# Patient Record
Sex: FEMALE | Race: WHITE | NOT HISPANIC OR LATINO | Employment: FULL TIME | URBAN - METROPOLITAN AREA
[De-identification: names, ages, dates, MRNs, and addresses within clinical notes are randomized per-mention and may not be internally consistent; named-entity substitution may affect disease eponyms.]

---

## 2021-08-09 ENCOUNTER — TRANSFERRED RECORDS (OUTPATIENT)
Dept: HEALTH INFORMATION MANAGEMENT | Facility: CLINIC | Age: 21
End: 2021-08-09

## 2021-09-01 ENCOUNTER — TRANSFERRED RECORDS (OUTPATIENT)
Dept: HEALTH INFORMATION MANAGEMENT | Facility: CLINIC | Age: 21
End: 2021-09-01

## 2021-09-08 ENCOUNTER — TRANSFERRED RECORDS (OUTPATIENT)
Dept: HEALTH INFORMATION MANAGEMENT | Facility: CLINIC | Age: 21
End: 2021-09-08

## 2021-09-13 ENCOUNTER — TRANSCRIBE ORDERS (OUTPATIENT)
Dept: MATERNAL FETAL MEDICINE | Facility: CLINIC | Age: 21
End: 2021-09-13

## 2021-09-13 DIAGNOSIS — O26.90 PREGNANCY RELATED CONDITION, ANTEPARTUM: Primary | ICD-10-CM

## 2021-09-14 ENCOUNTER — PRE VISIT (OUTPATIENT)
Dept: MATERNAL FETAL MEDICINE | Facility: CLINIC | Age: 21
End: 2021-09-14

## 2021-09-15 ENCOUNTER — OFFICE VISIT (OUTPATIENT)
Dept: MATERNAL FETAL MEDICINE | Facility: CLINIC | Age: 21
End: 2021-09-15
Attending: FAMILY MEDICINE
Payer: COMMERCIAL

## 2021-09-15 ENCOUNTER — HOSPITAL ENCOUNTER (OUTPATIENT)
Dept: ULTRASOUND IMAGING | Facility: CLINIC | Age: 21
Discharge: HOME | End: 2021-09-15
Attending: FAMILY MEDICINE | Admitting: FAMILY MEDICINE
Payer: COMMERCIAL

## 2021-09-15 DIAGNOSIS — O26.90 PREGNANCY RELATED CONDITION, ANTEPARTUM: ICD-10-CM

## 2021-09-15 DIAGNOSIS — O28.8 AMNIOTIC FLUID INDEX BORDERLINE LOW: Primary | ICD-10-CM

## 2021-09-15 LAB — RUPTURE OF FETAL MEMBRANES BY ROM PLUS: NEGATIVE

## 2021-09-15 PROCEDURE — 76811 OB US DETAILED SNGL FETUS: CPT

## 2021-09-15 PROCEDURE — 99242 OFF/OP CONSLTJ NEW/EST SF 20: CPT | Mod: 25 | Performed by: OBSTETRICS & GYNECOLOGY

## 2021-09-15 PROCEDURE — 76811 OB US DETAILED SNGL FETUS: CPT | Mod: 26 | Performed by: OBSTETRICS & GYNECOLOGY

## 2021-09-15 PROCEDURE — 84112 EVAL AMNIOTIC FLUID PROTEIN: CPT | Performed by: OBSTETRICS & GYNECOLOGY

## 2021-09-16 ENCOUNTER — MEDICAL CORRESPONDENCE (OUTPATIENT)
Dept: HEALTH INFORMATION MANAGEMENT | Facility: CLINIC | Age: 21
End: 2021-09-16

## 2021-09-17 NOTE — PROGRESS NOTES
Jessica Aide was seen for an ultrasound today at the Maternal-Fetal Medicine center.      For the details of the ultrasound please see the report which can be found under the imaging tab.      Jeannine Trevizo MD  , OB/GYN  Maternal-Fetal Medicine  [email or web address]  [phone] (Main M Office)  [phone] or [phone] (for 24 hour MFM questions)  [phone] (Pager)

## 2021-10-06 ENCOUNTER — HOSPITAL ENCOUNTER (OUTPATIENT)
Dept: ULTRASOUND IMAGING | Facility: CLINIC | Age: 21
End: 2021-10-06
Attending: OBSTETRICS & GYNECOLOGY
Payer: MEDICAID

## 2021-10-06 ENCOUNTER — OFFICE VISIT (OUTPATIENT)
Dept: MATERNAL FETAL MEDICINE | Facility: CLINIC | Age: 21
End: 2021-10-06
Attending: OBSTETRICS & GYNECOLOGY
Payer: MEDICAID

## 2021-10-06 ENCOUNTER — LAB (OUTPATIENT)
Dept: LAB | Facility: CLINIC | Age: 21
End: 2021-10-06
Attending: OBSTETRICS & GYNECOLOGY
Payer: MEDICAID

## 2021-10-06 DIAGNOSIS — O28.3 ABNORMAL ULTRASONIC FINDING ON ANTENATAL SCREENING OF MOTHER: ICD-10-CM

## 2021-10-06 DIAGNOSIS — O33.7XX0 FETAL ASCITES CAUSING DISPROPORTION: Primary | ICD-10-CM

## 2021-10-06 DIAGNOSIS — Z04.89 ENCOUNTER FOR EXAMINATION AND OBSERVATION FOR OTHER SPECIFIED REASONS: ICD-10-CM

## 2021-10-06 DIAGNOSIS — O28.3 ABNORMAL ULTRASONIC FINDING ON ANTENATAL SCREENING OF MOTHER: Primary | ICD-10-CM

## 2021-10-06 DIAGNOSIS — O33.7XX0 FETAL ASCITES CAUSING DISPROPORTION: ICD-10-CM

## 2021-10-06 LAB
ABO/RH(D): NORMAL
ANTIBODY SCREEN: NEGATIVE
FETAL RBC % LFV: 0.2 %
FETAL RBC (ML): 12 ML
IF INDICATED RECOMMENDED DOSE OF RH IMMUNE GLOBULIN UG: 300 UG
Lab: NORMAL
PERFORMING LABORATORY: NORMAL
SPECIMEN EXPIRATION DATE: NORMAL
SPECIMEN STATUS: NORMAL
TEST NAME: NORMAL

## 2021-10-06 PROCEDURE — 87798 DETECT AGENT NOS DNA AMP: CPT | Performed by: OBSTETRICS & GYNECOLOGY

## 2021-10-06 PROCEDURE — 96372 THER/PROPH/DIAG INJ SC/IM: CPT | Performed by: OBSTETRICS & GYNECOLOGY

## 2021-10-06 PROCEDURE — 86900 BLOOD TYPING SEROLOGIC ABO: CPT

## 2021-10-06 PROCEDURE — 59000 AMNIOCENTESIS DIAGNOSTIC: CPT

## 2021-10-06 PROCEDURE — 76821 MIDDLE CEREBRAL ARTERY ECHO: CPT

## 2021-10-06 PROCEDURE — 76816 OB US FOLLOW-UP PER FETUS: CPT

## 2021-10-06 PROCEDURE — 76816 OB US FOLLOW-UP PER FETUS: CPT | Mod: 26 | Performed by: OBSTETRICS & GYNECOLOGY

## 2021-10-06 PROCEDURE — 84999 UNLISTED CHEMISTRY PROCEDURE: CPT | Performed by: OBSTETRICS & GYNECOLOGY

## 2021-10-06 PROCEDURE — 87496 CYTOMEG DNA AMP PROBE: CPT | Mod: XU | Performed by: OBSTETRICS & GYNECOLOGY

## 2021-10-06 PROCEDURE — 85460 HEMOGLOBIN FETAL: CPT

## 2021-10-06 PROCEDURE — 88275 CYTOGENETICS 100-300: CPT | Performed by: OBSTETRICS & GYNECOLOGY

## 2021-10-06 PROCEDURE — 59000 AMNIOCENTESIS DIAGNOSTIC: CPT | Performed by: OBSTETRICS & GYNECOLOGY

## 2021-10-06 PROCEDURE — 96040 HC GENETIC COUNSELING, EACH 30 MINUTES: CPT | Performed by: GENETIC COUNSELOR, MS

## 2021-10-06 PROCEDURE — 88271 CYTOGENETICS DNA PROBE: CPT | Mod: XU | Performed by: OBSTETRICS & GYNECOLOGY

## 2021-10-06 PROCEDURE — 81265 STR MARKERS SPECIMEN ANAL: CPT | Performed by: OBSTETRICS & GYNECOLOGY

## 2021-10-06 PROCEDURE — 76946 ECHO GUIDE FOR AMNIOCENTESIS: CPT | Mod: 26 | Performed by: OBSTETRICS & GYNECOLOGY

## 2021-10-06 PROCEDURE — 36415 COLL VENOUS BLD VENIPUNCTURE: CPT

## 2021-10-06 PROCEDURE — 99215 OFFICE O/P EST HI 40 MIN: CPT | Mod: 25 | Performed by: OBSTETRICS & GYNECOLOGY

## 2021-10-06 PROCEDURE — 76821 MIDDLE CEREBRAL ARTERY ECHO: CPT | Mod: 26 | Performed by: OBSTETRICS & GYNECOLOGY

## 2021-10-06 PROCEDURE — 250N000011 HC RX IP 250 OP 636: Performed by: OBSTETRICS & GYNECOLOGY

## 2021-10-06 PROCEDURE — 81265 STR MARKERS SPECIMEN ANAL: CPT | Mod: XU

## 2021-10-06 RX ORDER — BETAMETHASONE SODIUM PHOSPHATE AND BETAMETHASONE ACETATE 3; 3 MG/ML; MG/ML
12 INJECTION, SUSPENSION INTRA-ARTICULAR; INTRALESIONAL; INTRAMUSCULAR; SOFT TISSUE ONCE
Status: COMPLETED | OUTPATIENT
Start: 2021-10-06 | End: 2021-10-06

## 2021-10-06 RX ADMIN — BETAMETHASONE SODIUM PHOSPHATE AND BETAMETHASONE ACETATE 12 MG: 3; 3 INJECTION, SUSPENSION INTRA-ARTICULAR; INTRALESIONAL; INTRAMUSCULAR at 11:13

## 2021-10-06 NOTE — PROGRESS NOTES
"Please see \"Imaging\" tab under \"Chart Review\" for details of today's visit.    Penny Davila    "

## 2021-10-06 NOTE — NURSING NOTE
Pt here for amniocentesis d/t fetal ascites. Saw [facility], see their dictation.  After consent signed and TimeOut completed, Dr. Davila withdrew adequate fluid x1 transabdominal pass.    Patient reports 3/10 pain, reports improvement since procedure has ended.  Pt is RH positive.  MD reviewed blood type and screen and Rhogam NOT indicated.  Discharge teaching completed and questions answered.  Pt discharged ambulatory and stable.   Lab alerted by calling phone number on amnio work-aid for [facility] site.  [employer] notified of specimen.  Specimen transported to main lab, warm hand-off completed.

## 2021-10-06 NOTE — LETTER
10/6/2021  AideJessica   2000        To Whom It May Concern:    Please excuse Jessica from work today, 10/6/21, for medical purposes related to her pregnancy. Jessica will need ongoing frequent surveillance due to pregnancy complications which will also require her to be excused from work.    Sincerely,      Dr. Davila

## 2021-10-06 NOTE — PATIENT INSTRUCTIONS
As part of your visit in Maternal Fetal Medicine, you elected to have a genetic amniocentesis, an invasive diagnostic procedure. The following information was discussed.:    Amniocentesis is an invasive test that can diagnose these types of structural chromosome abnormalities with greater than 99% accuracy.     The risk of pregnancy loss association with amniocentesis is generally estimated to be 1/500  or less.    Mild cramping is very common. It usually goes away within a few hours. You may take Acetaminophen (Tylenol ) for this if needed    Avoid strenuous activities for the rest of the day after the Amniocentesis is done. This includes heavy lifting, jogging or other exercise.    You should call your regular obstetric (OB) care provider if you have:  o Heavy bleeding  o Clear fluid (like water) leaking from your vagina  o Severe abdominal (belly) pain  o Flu-like symptoms within two weeks of the test. These include chills, muscle aches or a fever over 100.4 F (38 C) (under the tongue).  The following testing was ordered on the amniotic fluid sample:    FISH preliminary analysis - results by Friday or Monday.    Microarray analysis - results typically available in 7-10 days. Testing may take up to 14 days.  o Maternal Cell Contamination studies are performed on amnio specimens with microarray    PCR studies for cytomegalovirus, parvovirus, and toxoplasmosis. Expected within 3-10 days.     Culture for possible hydrops panel if indicated.    Results are not guaranteed  Results will be communicated to you, forwarded to your primary OB provider, and available in [employer].

## 2021-10-07 ENCOUNTER — HOSPITAL ENCOUNTER (OUTPATIENT)
Facility: CLINIC | Age: 21
Setting detail: OBSERVATION
Discharge: HOME | End: 2021-10-08
Attending: OBSTETRICS & GYNECOLOGY | Admitting: OBSTETRICS & GYNECOLOGY
Payer: MEDICAID

## 2021-10-07 ENCOUNTER — OFFICE VISIT (OUTPATIENT)
Dept: MATERNAL FETAL MEDICINE | Facility: CLINIC | Age: 21
End: 2021-10-07
Attending: OBSTETRICS & GYNECOLOGY
Payer: MEDICAID

## 2021-10-07 ENCOUNTER — HOSPITAL ENCOUNTER (OUTPATIENT)
Dept: ULTRASOUND IMAGING | Facility: CLINIC | Age: 21
End: 2021-10-07
Attending: OBSTETRICS & GYNECOLOGY
Payer: MEDICAID

## 2021-10-07 DIAGNOSIS — O43.012: ICD-10-CM

## 2021-10-07 DIAGNOSIS — O33.7XX0 FETAL ASCITES CAUSING DISPROPORTION: Primary | ICD-10-CM

## 2021-10-07 DIAGNOSIS — O33.7XX0 FETAL ASCITES CAUSING DISPROPORTION: ICD-10-CM

## 2021-10-07 PROBLEM — O35.9XX0 FETAL ABNORMALITY AFFECTING MANAGEMENT OF MOTHER, ANTEPARTUM, SINGLE OR UNSPECIFIED FETUS: Status: ACTIVE | Noted: 2021-10-07

## 2021-10-07 PROBLEM — O35.9XX0 FETAL ABNORMALITY AFFECTING MANAGEMENT OF MOTHER: Status: ACTIVE | Noted: 2021-10-07

## 2021-10-07 LAB
CLUE CELLS: ABNORMAL
Lab: NORMAL
PERFORMING LABORATORY: NORMAL
RUPTURE OF FETAL MEMBRANES BY ROM PLUS: NEGATIVE
SARS-COV-2 RNA RESP QL NAA+PROBE: NEGATIVE
SPECIMEN STATUS: NORMAL
TEST NAME: NORMAL
TRICHOMONAS, WET PREP: ABNORMAL
WBC'S/HIGH POWER FIELD, WET PREP: ABNORMAL
YEAST, WET PREP: ABNORMAL

## 2021-10-07 PROCEDURE — 84112 EVAL AMNIOTIC FLUID PROTEIN: CPT | Performed by: STUDENT IN AN ORGANIZED HEALTH CARE EDUCATION/TRAINING PROGRAM

## 2021-10-07 PROCEDURE — C9803 HOPD COVID-19 SPEC COLLECT: HCPCS

## 2021-10-07 PROCEDURE — 76821 MIDDLE CEREBRAL ARTERY ECHO: CPT

## 2021-10-07 PROCEDURE — 76821 MIDDLE CEREBRAL ARTERY ECHO: CPT | Mod: 26 | Performed by: OBSTETRICS & GYNECOLOGY

## 2021-10-07 PROCEDURE — 96372 THER/PROPH/DIAG INJ SC/IM: CPT | Performed by: OBSTETRICS & GYNECOLOGY

## 2021-10-07 PROCEDURE — G0463 HOSPITAL OUTPT CLINIC VISIT: HCPCS | Mod: 25

## 2021-10-07 PROCEDURE — 250N000011 HC RX IP 250 OP 636: Performed by: OBSTETRICS & GYNECOLOGY

## 2021-10-07 PROCEDURE — 120N000002 HC R&B MED SURG/OB UMMC

## 2021-10-07 PROCEDURE — G0378 HOSPITAL OBSERVATION PER HR: HCPCS

## 2021-10-07 PROCEDURE — 87210 SMEAR WET MOUNT SALINE/INK: CPT | Performed by: STUDENT IN AN ORGANIZED HEALTH CARE EDUCATION/TRAINING PROGRAM

## 2021-10-07 PROCEDURE — 76816 OB US FOLLOW-UP PER FETUS: CPT

## 2021-10-07 PROCEDURE — U0005 INFEC AGEN DETEC AMPLI PROBE: HCPCS | Performed by: STUDENT IN AN ORGANIZED HEALTH CARE EDUCATION/TRAINING PROGRAM

## 2021-10-07 PROCEDURE — 87491 CHLMYD TRACH DNA AMP PROBE: CPT | Performed by: STUDENT IN AN ORGANIZED HEALTH CARE EDUCATION/TRAINING PROGRAM

## 2021-10-07 PROCEDURE — 99221 1ST HOSP IP/OBS SF/LOW 40: CPT | Mod: AI | Performed by: OBSTETRICS & GYNECOLOGY

## 2021-10-07 PROCEDURE — 76816 OB US FOLLOW-UP PER FETUS: CPT | Mod: 26 | Performed by: OBSTETRICS & GYNECOLOGY

## 2021-10-07 RX ORDER — PRENATAL VIT/IRON FUM/FOLIC AC 27MG-0.8MG
1 TABLET ORAL DAILY
COMMUNITY

## 2021-10-07 RX ORDER — DIPHENHYDRAMINE HCL 25 MG
25 CAPSULE ORAL EVERY 6 HOURS PRN
Status: DISCONTINUED | OUTPATIENT
Start: 2021-10-07 | End: 2021-10-08 | Stop reason: HOSPADM

## 2021-10-07 RX ORDER — PRENATAL VIT/IRON FUM/FOLIC AC 27MG-0.8MG
1 TABLET ORAL DAILY
Status: DISCONTINUED | OUTPATIENT
Start: 2021-10-08 | End: 2021-10-08 | Stop reason: HOSPADM

## 2021-10-07 RX ORDER — POLYETHYLENE GLYCOL 3350 17 G/17G
25.5 POWDER, FOR SOLUTION ORAL DAILY
Status: DISCONTINUED | OUTPATIENT
Start: 2021-10-08 | End: 2021-10-08 | Stop reason: HOSPADM

## 2021-10-07 RX ORDER — DIPHENHYDRAMINE HYDROCHLORIDE 50 MG/ML
25 INJECTION INTRAMUSCULAR; INTRAVENOUS EVERY 6 HOURS PRN
Status: DISCONTINUED | OUTPATIENT
Start: 2021-10-07 | End: 2021-10-08 | Stop reason: HOSPADM

## 2021-10-07 RX ORDER — MAGNESIUM HYDROXIDE/ALUMINUM HYDROXICE/SIMETHICONE 120; 1200; 1200 MG/30ML; MG/30ML; MG/30ML
30 SUSPENSION ORAL
Status: DISCONTINUED | OUTPATIENT
Start: 2021-10-07 | End: 2021-10-08 | Stop reason: HOSPADM

## 2021-10-07 RX ORDER — SIMETHICONE 80 MG
160 TABLET,CHEWABLE ORAL EVERY 4 HOURS PRN
Status: DISCONTINUED | OUTPATIENT
Start: 2021-10-07 | End: 2021-10-08 | Stop reason: HOSPADM

## 2021-10-07 RX ORDER — BETAMETHASONE SODIUM PHOSPHATE AND BETAMETHASONE ACETATE 3; 3 MG/ML; MG/ML
12 INJECTION, SUSPENSION INTRA-ARTICULAR; INTRALESIONAL; INTRAMUSCULAR; SOFT TISSUE ONCE
Status: DISCONTINUED | OUTPATIENT
Start: 2021-10-07 | End: 2021-10-07

## 2021-10-07 RX ORDER — ACETAMINOPHEN 325 MG/1
650 TABLET ORAL EVERY 4 HOURS PRN
Status: DISCONTINUED | OUTPATIENT
Start: 2021-10-07 | End: 2021-10-08 | Stop reason: HOSPADM

## 2021-10-07 RX ORDER — POLYETHYLENE GLYCOL 3350 17 G/17G
1.5 POWDER, FOR SOLUTION ORAL DAILY
COMMUNITY

## 2021-10-07 RX ADMIN — BETAMETHASONE SODIUM PHOSPHATE AND BETAMETHASONE ACETATE 12 MG: 3; 3 INJECTION, SUSPENSION INTRA-ARTICULAR; INTRALESIONAL; INTRAMUSCULAR at 10:36

## 2021-10-07 ASSESSMENT — MIFFLIN-ST. JEOR: SCORE: 1321.53

## 2021-10-07 ASSESSMENT — ACTIVITIES OF DAILY LIVING (ADL)
HEARING_DIFFICULTY_OR_DEAF: NO
VISION_MANAGEMENT: GLASSES AND CONTACTS

## 2021-10-07 NOTE — NURSING NOTE
MEDICATION: Celestone Soluspan (Betamethasone)  ROUTE:  Intramuscular  TIME:   10:36  SITE:    Right ventrogluteal  DOSE:  12mg/2ml   (6mg/ml)  LOT #: [id number]  EXPIRATION DATE:    Waste: 3ml  Education Sheet reviewed with patient:  Done    Pt sent to [facility] for extended monitoring due to +KB.  Nicki rhodes RN notified and given SBAR.  On call MD aware and spoke with Dr Trevizo.  Pt given map and directions and denies questions/concerns.

## 2021-10-07 NOTE — LETTER
[facility]   [address] [place] [address]  Phone: [phone]    October 8, 2021        Jessica Noble  [address]   [facility] [address]    To whom it may concern:    RE: Jessica Noble (2000) was admitted to [facility] on 10/7/2021  for medical complications. She is expected to discharge on 10/8/2021 if there are no further complications.    We write to request your support of Jessica who has been away from work due to being admitted to the hospital.      Please feel free to access our unit , KAYA Zuñiga, if you require additional information to support this request.         Sincerely,     MD Ailyn Arthur, ROXI, IDALIA  Maternal and Child Health   Office: [phone]  Pager: [phone]  After Hours Pager: [phone]  [email or web address]
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
## 2021-10-07 NOTE — PROGRESS NOTES
Jessica Aide was seen for an ultrasound today at the Maternal-Fetal Medicine center.      For the details of the ultrasound please see the report which can be found under the imaging tab.      Jeannine Trevizo MD  , OB/GYN  Maternal-Fetal Medicine  [email or web address]  [phone] (Main M Office)  [phone] or [phone] (for 24 hour MFM questions)  [phone] (Pager)
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
## 2021-10-08 ENCOUNTER — APPOINTMENT (OUTPATIENT)
Dept: ULTRASOUND IMAGING | Facility: CLINIC | Age: 21
End: 2021-10-08
Attending: OBSTETRICS & GYNECOLOGY
Payer: MEDICAID

## 2021-10-08 VITALS
TEMPERATURE: 98.4 F | BODY MASS INDEX: 19.13 KG/M2 | DIASTOLIC BLOOD PRESSURE: 61 MMHG | RESPIRATION RATE: 16 BRPM | HEIGHT: 66 IN | SYSTOLIC BLOOD PRESSURE: 108 MMHG | HEART RATE: 112 BPM | WEIGHT: 119 LBS

## 2021-10-08 PROBLEM — O35.9XX0 FETAL ABNORMALITY AFFECTING MANAGEMENT OF MOTHER, ANTEPARTUM: Status: ACTIVE | Noted: 2021-10-08

## 2021-10-08 LAB
C TRACH DNA SPEC QL PROBE+SIG AMP: NEGATIVE
N GONORRHOEA DNA SPEC QL NAA+PROBE: NEGATIVE

## 2021-10-08 PROCEDURE — G0378 HOSPITAL OBSERVATION PER HR: HCPCS

## 2021-10-08 PROCEDURE — G0463 HOSPITAL OUTPT CLINIC VISIT: HCPCS | Mod: 25

## 2021-10-08 PROCEDURE — 76816 OB US FOLLOW-UP PER FETUS: CPT | Mod: 26 | Performed by: OBSTETRICS & GYNECOLOGY

## 2021-10-08 PROCEDURE — 76821 MIDDLE CEREBRAL ARTERY ECHO: CPT

## 2021-10-08 PROCEDURE — 76816 OB US FOLLOW-UP PER FETUS: CPT

## 2021-10-08 PROCEDURE — 99231 SBSQ HOSP IP/OBS SF/LOW 25: CPT | Mod: 25 | Performed by: OBSTETRICS & GYNECOLOGY

## 2021-10-08 PROCEDURE — 250N000013 HC RX MED GY IP 250 OP 250 PS 637: Performed by: OBSTETRICS & GYNECOLOGY

## 2021-10-08 PROCEDURE — 76821 MIDDLE CEREBRAL ARTERY ECHO: CPT | Mod: 26 | Performed by: OBSTETRICS & GYNECOLOGY

## 2021-10-08 RX ADMIN — POLYETHYLENE GLYCOL 3350 26 G: 17 POWDER, FOR SOLUTION ORAL at 08:15

## 2021-10-08 RX ADMIN — PRENATAL VITAMINS-IRON FUMARATE 27 MG IRON-FOLIC ACID 0.8 MG TABLET 1 TABLET: at 08:13
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
## 2021-10-08 NOTE — UTILIZATION REVIEW
"[facility]    Admission Status; Secondary Review Determination     Admission Date: 10/7/2021 12:01 PM      Under the authority of the Utilization Management Committee, the utilization review process indicated a secondary review on the above patient.  The review outcome is based on review of the medical records, discussions with staff, and applying clinical experience noted on the date of the review.          (x) Observation Status Appropriate - This patient does not meet hospital inpatient criteria and is placed in observation status. If this patient's primary payer is Medicare and was admitted as an inpatient, Condition Code 44 should be used and patient status changed to \"observation\".     RATIONALE FOR DETERMINATION   20-year-old female who is about 23 weeks pregnant was admitted yesterday with new onset fetal ascites in the context of normal MCA Dopplers and no clear etiology.  Amniocentesis was performed for fetal karyotype and parvovirus infection status.  No other concerning findings for fetal hydrops.  She repeated ultrasound today with stable findings.  She will likely discharge later today.  At the time of this review the patient does not meet medical necessity for inpatient hospitalization and observation status is recommended.    The severity of illness, intensity of service provided, expected LOS and risk for adverse outcome make the care appropriate for further observation; however, doesn't meet criteria for hospital inpatient admission.  Dr Molina was paged/notified of this determination.        The information on this document is developed by the utilization review team in order for the business office to ensure compliance.  This only denotes the appropriateness of proper admission status and does not reflect the quality of care rendered.         The definitions of Inpatient Status and Observation Status used in making the determination above are those provided in the CMS Coverage Manual, Chapter 1 and " Chapter 6, section 70.4.      Sincerely,     Braulio Sanchez DO MPH   Physician Advisor  Utilization Review  [facility]

## 2021-10-08 NOTE — PROGRESS NOTES
Please see full imaging report from ViewPoint program under imaging tab.    Inpatient US stable from 10/7 imaging.     Aris Molina MD  Maternal Fetal Medicine

## 2021-10-08 NOTE — DISCHARGE INSTRUCTIONS
Discharge Instruction for Undelivered Patients      You were seen for: Fetal Assessment  We Consulted: Dr Molina  You had (Test or Medicine): labs, fetal monitoring, US, SW consult.      Call your provider if you notice:  Swelling in your face or increased swelling in your hands or legs.  Headaches that are not relieved by Tylenol (acetaminophen).  Changes in your vision (blurring: seeing spots or stars.)  Nausea (sick to your stomach) and vomiting (throwing up).   Weight gain of 5 pounds or more per week.  Heartburn that doesn't go away.  Signs of bladder infection: pain when you urinate (use the toilet), need to go more often and more urgently.  The bag of harding (rupture of membranes) breaks, or you notice leaking in your underwear.  Bright red blood in your underwear.  Abdominal (lower belly) or stomach pain.  For first baby: Contractions (tightening) less than 5 minutes apart for one hour or more.  Second (plus) baby: Contractions (tightening) less than 10 minutes apart and getting stronger.  *If less than 34 weeks: Contractions (tightening) more than 6 times in one hour.  Increase or change in vaginal discharge (note the color and amount)  Follow-up:  As scheduled in the clinic

## 2021-10-08 NOTE — PLAN OF CARE
VSS, afebrile. EFM see flowsheet. Pt denies bleeding or LOF. Reports continued pale yellow discharge. Denies cramping or ctx. Offers no complaints overnight. Continue with plan of care.

## 2021-10-08 NOTE — PLAN OF CARE
Pt states is comfortable, denies pain, leaking or bleeding. Discharge home ambulating at 1215. She is driving alone home. SW consult done. Discharge instructions given, states no questions.

## 2021-10-08 NOTE — PROVIDER NOTIFICATION
10/07/21 1945   Provider Notification   Provider Name/Title Alejandra   Method of Notification Electronic Page   Notification Reason Status Update   Pt reports going to the bathroom and then noticed a small amount of fluid on her underwear. States it was the color yellow. Informed Dr. Roberts. Pt denies vaginal bleeding, cramping and endorses +FM. Pt was given pad to wear and encouraged to inform nursing of further leaking.     Addendum: Wet prep, GC/Chlam and ROM+ collected. Results pending.

## 2021-10-08 NOTE — PLAN OF CARE
Pt has no complaints. VSS. Afebrile. Denies vaginal bleeding, cramping or contractions. Endorses + FM. Denies vision changes, RUQ pain, epigastric pain, shortness of breath, HA and increased swelling. FHT's AGA. [place] quiet. ROM+ negative. Wet prep 1+ WBCs but otherwise neg. GC/Chlam pending. Patient aware to call nursing for any questions or concerns. Pt stable at this time. Continue with current POC.

## 2021-10-08 NOTE — DISCHARGE SUMMARY
[facility] Discharge Summary    Jessica Noble MRN# [id number]   Age: 20 year old YOB: 2000     Date of Admission:  10/7/2021  Date of Discharge:  10/8/2021  Admitting Physician:  Aris Molina MD  Discharge Physician:  Aris Molina MD    Admit Dx:   - Intrauterine pregnancy at 23w4d   - Fetal ascites    Discharge Dx:  - Same as above    Procedures:  - [facility] US    Admit HPI:  Jessica Noble is a 20 year old  at 23w3d by LMP consistent with 9w2d ultrasound.       She initially presented to [facility] due to low amniotic fluid. Not all fetal anatomy was well visualized at that visit on 9/15/21, so she was scheduled for follow up. On 10/6, she was noted to have new onset fetal ascites in the context of normal MCA dopplers and no clear etiology. An amniocentesis was performed for fetal karyotype and parvovirus infection status.      Today she returned with persistent ascites, normal MCA Doppler (indicating low risk of moderate to severe fetal anemia) and a lab finding post-amnio of a positive KHB screen (suggesting fetal maternal hemorrhage).      She had contractions for a couple hours following amniocentesis yesterday. Currently, no contractions, bleeding, or changes in fetal movement. She has had stable watery discharge.  She has not had spotting since early in pregnancy.     No headache, chest pain, shortness of breath, nausea, vomiting, diarrhea, or leg pain. She does have constipation for which she takes Miralax. Last bowel movement was today.     Please see her Admission H&P and Delivery Summary for further details.    Hospital Course:  She was admitted for fetal monitoring, repeat ultrasound, and repeat MCA dopplers. Fetal monitoring was appropriate for gestational age. Ultrasound showed stable abdominopelvic ascites. MCA dopplers were normal. Her KB was elevated on admission, but this was attributed to being done following amniocentesis. Per lab  medicine, there was no indication for repeat KB test because it is not helpful for assessing changes in degree of fetal maternal hemorrhage. Given MCA dopplers, the suspicion for fetal maternal hemorrhage is low. Social work was consulted for her complicated social situation. She denied suicidal ideation and declined sertraline. On HD#2 maternal and fetal status was stable and she was discharged home.     Discharge Medications:  Discharge Medication List as of 10/8/2021 12:08 PM      CONTINUE these medications which have NOT CHANGED    Details   polyethylene glycol (MIRALAX) 17 GM/Dose powder Take 1.5 capfuls by mouth daily 2 capfuls daily, Historical      Prenatal Vit-Fe Fumarate-FA (PRENATAL MULTIVITAMIN W/IRON) 27-0.8 MG tablet Take 1 tablet by mouth daily, Historical           Discharge/Disposition:  Jessica Noble was discharged to home in stable condition with the following instructions/medications:  1) Call for temperature > 100.4, bright red vaginal bleeding >1 pad an hour x 2 hours, foul smelling vaginal discharge, pain not controlled by usual oral pain meds, persistent nausea and vomiting not controlled on medications  2) Follow-up ([facility])  - 10/12/2021 [facility]  - 10/15/2021 [facility]    Ruiz Rangel MS4    Appreciate note above. I evaluated the patient with the medical student and [facility] staff. I have verified the accuracy of the above documentation and made necessary edits.    Nayan Kirkland MD  Ob/Gyn Resident, PGY-3  10/08/21 2:37 PM    I was present during all key and critical portions of this service. I have made edits to this evaluation note as this is an accurate and comprehensive report on our assessment and plan for this patient.     Aris Molina MD

## 2021-10-09 DIAGNOSIS — Z71.89 OTHER SPECIFIED COUNSELING: ICD-10-CM

## 2021-10-09 LAB
B19V DNA SER QL NAA+PROBE: NOT DETECTED
MISCELLANEOUS TEST 1 (ARUP): NORMAL

## 2021-10-11 ENCOUNTER — PATIENT OUTREACH (OUTPATIENT)
Dept: CARE COORDINATION | Facility: CLINIC | Age: 21
End: 2021-10-11

## 2021-10-11 ENCOUNTER — TELEPHONE (OUTPATIENT)
Dept: MATERNAL FETAL MEDICINE | Facility: CLINIC | Age: 21
End: 2021-10-11

## 2021-10-11 LAB — MISCELLANEOUS TEST 1 (ARUP): NORMAL

## 2021-10-11 NOTE — TELEPHONE ENCOUNTER
October 11, 2021    I left Jessica a message regarding some of the results from her amniocentesis.     FISH results: Results are consistent with a normal male. There were two signals for chromosomes 21, 18, 13, and the sex chromosomes (XY). These results, although reassuring, are not the final results. The final microarray result should be completed in approximately 10 days.     Viral studies: Cytomegalovirus and parvovirus by qualitative PCR were NOT detected in the amniotic fluid.     Results for the chromosomal microarray and for toxoplasmosis are still pending.     I encouraged Jessica to call me if she has questions. We can discuss options for further testing when the remainder of the results return.     Citlalli Wills MS, [facility]  Licensed Genetic Counselor  [facility]  Maternal Fetal Medicine  [email or web address]  [phone]

## 2021-10-11 NOTE — TELEPHONE ENCOUNTER
October 11, 2021    I left Jessica a message to disclose the results of the toxoplasmosis study performed on amniotic fluid.     Toxoplasmosis was NOT identified in the prenatal specimen.    Still pending is the chromosomal microarray. I will call Jessica as soon as that returns. I encouraged her to call me in the interim if there are questions.     Citlalli Wills MS, [facility]  Licensed Genetic Counselor  [facility]  Maternal Fetal Medicine  [email or web address]  [phone]

## 2021-10-11 NOTE — PROGRESS NOTES
Clinic Care Coordination Contact  [facility]/Voicemail       Clinical Data: Care Coordinator Outreach    Outreach attempted x 1.  Left message on patient's voicemail with call back information and requested return call.    Plan:  Care Coordinator will try to reach patient again in 1-2 business days.    Lori Mccullough, [facility]  [phone]  [facility]

## 2021-10-12 ENCOUNTER — HOSPITAL ENCOUNTER (OUTPATIENT)
Dept: ULTRASOUND IMAGING | Facility: CLINIC | Age: 21
End: 2021-10-12
Attending: OBSTETRICS & GYNECOLOGY
Payer: MEDICAID

## 2021-10-12 ENCOUNTER — OFFICE VISIT (OUTPATIENT)
Dept: MATERNAL FETAL MEDICINE | Facility: CLINIC | Age: 21
End: 2021-10-12
Attending: OBSTETRICS & GYNECOLOGY
Payer: MEDICAID

## 2021-10-12 DIAGNOSIS — O33.7XX0 FETAL ASCITES CAUSING DISPROPORTION: Primary | ICD-10-CM

## 2021-10-12 DIAGNOSIS — O33.7XX0 FETAL ASCITES CAUSING DISPROPORTION: ICD-10-CM

## 2021-10-12 PROCEDURE — 76821 MIDDLE CEREBRAL ARTERY ECHO: CPT

## 2021-10-12 PROCEDURE — 76816 OB US FOLLOW-UP PER FETUS: CPT | Mod: 26 | Performed by: OBSTETRICS & GYNECOLOGY

## 2021-10-12 PROCEDURE — 76821 MIDDLE CEREBRAL ARTERY ECHO: CPT | Mod: 26 | Performed by: OBSTETRICS & GYNECOLOGY

## 2021-10-12 PROCEDURE — 76816 OB US FOLLOW-UP PER FETUS: CPT

## 2021-10-12 NOTE — PROGRESS NOTES
Clinic Care Coordination Contact    Background: Care Coordination referral placed from [facility] discharge report for reason of patient meeting criteria for a TCM outreach call by [facility] team.    Assessment: Upon chart review, [facility] Team member will cancel/close the referral for TCM outreach due to reason below:     Patient has a follow up appointment with an appropriate provider today for hospital discharge.     Plan: Care Coordination referral for TCM outreach canceled.    VIVIANE Escudero  [facility] [place], [facility]

## 2021-10-12 NOTE — PROGRESS NOTES
Please see the imaging tab for details of the ultrasound performed today.    Jacqueline Mathews MD  Specialist in Maternal-Fetal Medicine

## 2021-10-13 LAB — MCCMA SPECIMEN: NORMAL

## 2021-10-14 ENCOUNTER — TELEPHONE (OUTPATIENT)
Dept: MATERNAL FETAL MEDICINE | Facility: CLINIC | Age: 21
End: 2021-10-14

## 2021-10-14 NOTE — TELEPHONE ENCOUNTER
Jessica calling to [facility] with c/o sharp back pain and right pelvic pain since last night, HA and bloody noses for 2 days. Pt denies vaginal bleeding or spotting. Denies concern with vaginal discharge or LOF. Pt reports + FM. Advised patient to call primary care OB and be seen at a hospital for evaluation close to home. Pt lives in [place] and receives care in [facility]. Pt has been following with [facility] due to fetal hydrops.       Jeannine Gonzalez RN

## 2021-10-15 ENCOUNTER — OFFICE VISIT (OUTPATIENT)
Dept: CARDIOLOGY | Facility: CLINIC | Age: 21
End: 2021-10-15
Payer: MEDICAID

## 2021-10-15 ENCOUNTER — OFFICE VISIT (OUTPATIENT)
Dept: MATERNAL FETAL MEDICINE | Facility: CLINIC | Age: 21
End: 2021-10-15
Attending: OBSTETRICS & GYNECOLOGY
Payer: MEDICAID

## 2021-10-15 ENCOUNTER — HOSPITAL ENCOUNTER (OUTPATIENT)
Dept: ULTRASOUND IMAGING | Facility: CLINIC | Age: 21
End: 2021-10-15
Attending: OBSTETRICS & GYNECOLOGY
Payer: MEDICAID

## 2021-10-15 ENCOUNTER — HOSPITAL ENCOUNTER (OUTPATIENT)
Dept: CARDIOLOGY | Facility: CLINIC | Age: 21
End: 2021-10-15
Attending: OBSTETRICS & GYNECOLOGY
Payer: MEDICAID

## 2021-10-15 DIAGNOSIS — O28.3 ABNORMAL ULTRASONIC FINDING ON ANTENATAL SCREENING OF MOTHER: Primary | ICD-10-CM

## 2021-10-15 DIAGNOSIS — O33.7XX0 FETAL ASCITES CAUSING DISPROPORTION: ICD-10-CM

## 2021-10-15 DIAGNOSIS — O35.BXX0 FETAL CARDIAC DISEASE AFFECTING PREGNANCY, SINGLE OR UNSPECIFIED FETUS: Primary | ICD-10-CM

## 2021-10-15 PROCEDURE — 99203 OFFICE O/P NEW LOW 30 MIN: CPT | Mod: 25 | Performed by: PEDIATRICS

## 2021-10-15 PROCEDURE — 76816 OB US FOLLOW-UP PER FETUS: CPT

## 2021-10-15 PROCEDURE — 76817 TRANSVAGINAL US OBSTETRIC: CPT | Mod: 26 | Performed by: OBSTETRICS & GYNECOLOGY

## 2021-10-15 PROCEDURE — 76821 MIDDLE CEREBRAL ARTERY ECHO: CPT

## 2021-10-15 PROCEDURE — 76821 MIDDLE CEREBRAL ARTERY ECHO: CPT | Mod: 26 | Performed by: OBSTETRICS & GYNECOLOGY

## 2021-10-15 PROCEDURE — 76816 OB US FOLLOW-UP PER FETUS: CPT | Mod: 26 | Performed by: OBSTETRICS & GYNECOLOGY

## 2021-10-15 PROCEDURE — 76825 ECHO EXAM OF FETAL HEART: CPT | Mod: 26 | Performed by: PEDIATRICS

## 2021-10-15 PROCEDURE — 76827 ECHO EXAM OF FETAL HEART: CPT | Mod: 26 | Performed by: PEDIATRICS

## 2021-10-15 PROCEDURE — 93325 DOPPLER ECHO COLOR FLOW MAPG: CPT | Mod: 26 | Performed by: PEDIATRICS

## 2021-10-15 PROCEDURE — 93325 DOPPLER ECHO COLOR FLOW MAPG: CPT | Mod: XS

## 2021-10-15 NOTE — PROGRESS NOTES
[facility]   Heart Center Fetal Consult Note    Patient:  Jessica Noble MRN:  [id number]   YOB: 2000 Age:  20 year old   Date of Visit:  10/15/2021 PCP:  Teddy Savage MD     Dear Doctor,     I had the pleasure of seeing Jessica Noble at the [facility] on 10/15/2021 in fetal cardiology consultation for fetal echocardiogram results. She presented today accompanied by herself. As you know, she is a 20 year old  at 24w4d who presented for fetal echocardiogram today because of fetal ascites.    I performed and interpreted the fetal echocardiogram today, which demonstrated normal fetal cardiac anatomy. Normal fetal intracardiac connections. Normal right and left ventricular size and function. No hydrops.     I reviewed the echo findings today with Jessica Noble. She is aware that the study was within normal limits with no major cardiac abnormalities. She is aware of the general limitations of fetal echocardiography. No additional fetal echocardiograms are recommended. Post- echocardiogram should be performed within 24 hours of life. A post- echocardiogram is recommended to to assess aortic arch.     Thank you for allowing me to participate in Jessica's care. Please do not hesitate to contact me with questions or concerns.    This visit was separate from the performance and interpretation of the ultrasound. The majority of the time (>50%) was spent in counseling and coordination of care. I spent approximately 30 minutes in face-to-face time reviewing the above considerations.    Alex Stewart M.D.  Pediatric Cardiology  [address], 5th floor, [place] [address]  Phone [phone]  Fax [phone]

## 2021-10-19 LAB
CHROM ANALY RESULT (ISCN): NORMAL
MATERNAL CELL CONTAM SPEC: NORMAL
PATHOLOGY STUDY: NORMAL
SERVICE CMNT-IMP: YES

## 2021-10-19 NOTE — TELEPHONE ENCOUNTER
October 19, 2021    I called Jessica to discuss the results of the chromosomal microarray performed on amniocytes.    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 Maternal cell contamination studies were negative; a single fetal genotype was detected.     This region contains 8 genes (FAN1, MTMR10, TRPM1,   [id number], [id number], [id number], KLF13, and OTUD7A) and overlaps a ninth (CHRNA7). None of these genes are associated with abdominal ascites in a fetus.     The clinical significance of this duplication is uncertain. Duplications in this region have sometimes been associated with developmental phenotypes, such as delay, but the clinical features are highly variable. It has also been detected in presumably unaffected individuals in control populations and in unaffected relatives of apparently affected individuals.     [facility] will allow parental studies to be performed at no charge if ordered within the next month. However, presence or absence of this variant in Jessica or the father of the pregnancy likely will not help elucidate its significance.    If Jessica's child begins to have clinical symptoms of concern after birth, reconsideration of the clinical significance of this variant may be prudent. I will also review her case with our pediatric genetics team to get further guidance.     Since the abdominal ascites has resolved in the fetus, there is no indication at this time to do further testing. I will have [facility] freeze the backup culture in the even that the ascites returns or comes back as hydrops, which would help guide a test plan.     Jessica let me know that she is doing okay right now and feeling relieved that her baby has been doing better. I encouraged her to reach out to me with any questions. Jessica is going to call me if she would like to proceed with family studies.    Citlalli Wills MS, [facility]  Licensed Genetic Counselor  [facility]  [place]  Maternal Fetal Medicine  [email or web address]  [phone]

## 2021-10-20 ENCOUNTER — OFFICE VISIT (OUTPATIENT)
Dept: MATERNAL FETAL MEDICINE | Facility: CLINIC | Age: 21
End: 2021-10-20
Attending: OBSTETRICS & GYNECOLOGY
Payer: MEDICAID

## 2021-10-20 ENCOUNTER — HOSPITAL ENCOUNTER (OUTPATIENT)
Dept: ULTRASOUND IMAGING | Facility: CLINIC | Age: 21
End: 2021-10-20
Attending: OBSTETRICS & GYNECOLOGY
Payer: MEDICAID

## 2021-10-20 ENCOUNTER — TRANSFERRED RECORDS (OUTPATIENT)
Dept: HEALTH INFORMATION MANAGEMENT | Facility: CLINIC | Age: 21
End: 2021-10-20

## 2021-10-20 DIAGNOSIS — O28.3 ABNORMAL ULTRASONIC FINDING ON ANTENATAL SCREENING OF MOTHER: ICD-10-CM

## 2021-10-20 DIAGNOSIS — O33.7XX0 FETAL ASCITES CAUSING DISPROPORTION: Primary | ICD-10-CM

## 2021-10-20 PROCEDURE — 76816 OB US FOLLOW-UP PER FETUS: CPT | Mod: 26 | Performed by: OBSTETRICS & GYNECOLOGY

## 2021-10-20 PROCEDURE — 76821 MIDDLE CEREBRAL ARTERY ECHO: CPT | Mod: 26 | Performed by: OBSTETRICS & GYNECOLOGY

## 2021-10-20 PROCEDURE — 76821 MIDDLE CEREBRAL ARTERY ECHO: CPT

## 2021-10-20 PROCEDURE — 76816 OB US FOLLOW-UP PER FETUS: CPT

## 2021-10-20 NOTE — TELEPHONE ENCOUNTER
October 20, 2021    Jessica left me a message letting me know that she has decided NOT to proceed with parental studies for the variant of uncertain significance.     Citlalli Wills MS, [facility]  Licensed Genetic Counselor  [facility]  Maternal Fetal Medicine  [email or web address]  [phone]

## 2021-10-20 NOTE — PROGRESS NOTES
"Please see \"Imaging\" tab under \"Chart Review\" for details of today's US at the [facility].    Rufino Collins MD  Maternal-Fetal Medicine    "

## 2021-10-22 LAB — MISCELLANEOUS TEST 1 (ARUP): NORMAL

## 2021-10-29 ENCOUNTER — OFFICE VISIT (OUTPATIENT)
Dept: MATERNAL FETAL MEDICINE | Facility: CLINIC | Age: 21
End: 2021-10-29
Attending: OBSTETRICS & GYNECOLOGY
Payer: MEDICAID

## 2021-10-29 ENCOUNTER — HOSPITAL ENCOUNTER (OUTPATIENT)
Dept: ULTRASOUND IMAGING | Facility: CLINIC | Age: 21
End: 2021-10-29
Attending: OBSTETRICS & GYNECOLOGY
Payer: MEDICAID

## 2021-10-29 DIAGNOSIS — O28.3 ABNORMAL ULTRASONIC FINDING ON ANTENATAL SCREENING OF MOTHER: ICD-10-CM

## 2021-10-29 DIAGNOSIS — O28.3 ABNORMAL ULTRASONIC FINDING ON ANTENATAL SCREENING OF MOTHER: Primary | ICD-10-CM

## 2021-10-29 PROCEDURE — 76816 OB US FOLLOW-UP PER FETUS: CPT

## 2021-10-29 PROCEDURE — 76821 MIDDLE CEREBRAL ARTERY ECHO: CPT | Mod: 26 | Performed by: OBSTETRICS & GYNECOLOGY

## 2021-10-29 PROCEDURE — 76821 MIDDLE CEREBRAL ARTERY ECHO: CPT

## 2021-10-29 PROCEDURE — 76816 OB US FOLLOW-UP PER FETUS: CPT | Mod: 26 | Performed by: OBSTETRICS & GYNECOLOGY

## 2021-10-29 NOTE — PROGRESS NOTES
Please see full imaging report from ViewPoint program under imaging tab.    Aris Molina MD  Maternal Fetal Medicine

## 2021-11-14 ENCOUNTER — HEALTH MAINTENANCE LETTER (OUTPATIENT)
Age: 21
End: 2021-11-14

## 2021-11-17 ENCOUNTER — HOSPITAL ENCOUNTER (OUTPATIENT)
Dept: ULTRASOUND IMAGING | Facility: CLINIC | Age: 21
End: 2021-11-17
Attending: OBSTETRICS & GYNECOLOGY
Payer: MEDICAID

## 2021-11-17 ENCOUNTER — OFFICE VISIT (OUTPATIENT)
Dept: MATERNAL FETAL MEDICINE | Facility: CLINIC | Age: 21
End: 2021-11-17
Attending: OBSTETRICS & GYNECOLOGY
Payer: MEDICAID

## 2021-11-17 VITALS — DIASTOLIC BLOOD PRESSURE: 69 MMHG | SYSTOLIC BLOOD PRESSURE: 108 MMHG

## 2021-11-17 DIAGNOSIS — O36.5990 PREGNANCY AFFECTED BY FETAL GROWTH RESTRICTION: Primary | ICD-10-CM

## 2021-11-17 DIAGNOSIS — O28.3 ABNORMAL ULTRASONIC FINDING ON ANTENATAL SCREENING OF MOTHER: ICD-10-CM

## 2021-11-17 PROCEDURE — 76816 OB US FOLLOW-UP PER FETUS: CPT | Mod: 26 | Performed by: OBSTETRICS & GYNECOLOGY

## 2021-11-17 PROCEDURE — 76816 OB US FOLLOW-UP PER FETUS: CPT

## 2021-11-17 PROCEDURE — 59025 FETAL NON-STRESS TEST: CPT

## 2021-11-17 PROCEDURE — 76820 UMBILICAL ARTERY ECHO: CPT | Mod: 26 | Performed by: OBSTETRICS & GYNECOLOGY

## 2021-11-17 PROCEDURE — 59025 FETAL NON-STRESS TEST: CPT | Mod: 26 | Performed by: OBSTETRICS & GYNECOLOGY

## 2021-11-24 ENCOUNTER — OFFICE VISIT (OUTPATIENT)
Dept: MATERNAL FETAL MEDICINE | Facility: CLINIC | Age: 21
End: 2021-11-24
Attending: OBSTETRICS & GYNECOLOGY
Payer: MEDICAID

## 2021-11-24 ENCOUNTER — HOSPITAL ENCOUNTER (OUTPATIENT)
Dept: ULTRASOUND IMAGING | Facility: CLINIC | Age: 21
End: 2021-11-24
Attending: OBSTETRICS & GYNECOLOGY
Payer: MEDICAID

## 2021-11-24 DIAGNOSIS — O36.5990 PREGNANCY AFFECTED BY FETAL GROWTH RESTRICTION: ICD-10-CM

## 2021-11-24 PROCEDURE — 76820 UMBILICAL ARTERY ECHO: CPT | Mod: 26 | Performed by: OBSTETRICS & GYNECOLOGY

## 2021-11-24 PROCEDURE — 59025 FETAL NON-STRESS TEST: CPT | Mod: 26 | Performed by: OBSTETRICS & GYNECOLOGY

## 2021-11-24 PROCEDURE — 76815 OB US LIMITED FETUS(S): CPT

## 2021-11-24 PROCEDURE — 76815 OB US LIMITED FETUS(S): CPT | Mod: 26 | Performed by: OBSTETRICS & GYNECOLOGY

## 2021-11-24 PROCEDURE — 59025 FETAL NON-STRESS TEST: CPT

## 2021-12-01 ENCOUNTER — OFFICE VISIT (OUTPATIENT)
Dept: MATERNAL FETAL MEDICINE | Facility: CLINIC | Age: 21
End: 2021-12-01
Attending: OBSTETRICS & GYNECOLOGY
Payer: COMMERCIAL

## 2021-12-01 ENCOUNTER — HOSPITAL ENCOUNTER (OUTPATIENT)
Dept: ULTRASOUND IMAGING | Facility: CLINIC | Age: 21
End: 2021-12-01
Attending: OBSTETRICS & GYNECOLOGY
Payer: COMMERCIAL

## 2021-12-01 DIAGNOSIS — O36.5990 PREGNANCY AFFECTED BY FETAL GROWTH RESTRICTION: ICD-10-CM

## 2021-12-01 PROCEDURE — 76820 UMBILICAL ARTERY ECHO: CPT

## 2021-12-01 PROCEDURE — 76815 OB US LIMITED FETUS(S): CPT | Mod: 26 | Performed by: OBSTETRICS & GYNECOLOGY

## 2021-12-01 PROCEDURE — 59025 FETAL NON-STRESS TEST: CPT

## 2021-12-01 PROCEDURE — 76820 UMBILICAL ARTERY ECHO: CPT | Mod: 26 | Performed by: OBSTETRICS & GYNECOLOGY

## 2021-12-01 PROCEDURE — 59025 FETAL NON-STRESS TEST: CPT | Mod: 26 | Performed by: OBSTETRICS & GYNECOLOGY

## 2021-12-07 ENCOUNTER — OFFICE VISIT (OUTPATIENT)
Dept: MATERNAL FETAL MEDICINE | Facility: CLINIC | Age: 21
End: 2021-12-07
Attending: OBSTETRICS & GYNECOLOGY
Payer: COMMERCIAL

## 2021-12-07 ENCOUNTER — TELEPHONE (OUTPATIENT)
Dept: OBGYN | Facility: CLINIC | Age: 21
End: 2021-12-07

## 2021-12-07 ENCOUNTER — HOSPITAL ENCOUNTER (OUTPATIENT)
Dept: ULTRASOUND IMAGING | Facility: CLINIC | Age: 21
End: 2021-12-07
Attending: OBSTETRICS & GYNECOLOGY
Payer: COMMERCIAL

## 2021-12-07 DIAGNOSIS — O36.5990 PREGNANCY AFFECTED BY FETAL GROWTH RESTRICTION: ICD-10-CM

## 2021-12-07 DIAGNOSIS — O36.5990 PREGNANCY AFFECTED BY FETAL GROWTH RESTRICTION: Primary | ICD-10-CM

## 2021-12-07 PROCEDURE — 76820 UMBILICAL ARTERY ECHO: CPT | Mod: 26 | Performed by: OBSTETRICS & GYNECOLOGY

## 2021-12-07 PROCEDURE — 76818 FETAL BIOPHYS PROFILE W/NST: CPT

## 2021-12-07 PROCEDURE — 76818 FETAL BIOPHYS PROFILE W/NST: CPT | Mod: 26 | Performed by: OBSTETRICS & GYNECOLOGY

## 2021-12-07 NOTE — NURSING NOTE
NST Performed due to FGR.   reviewed efm tracing. See NST/BPP Doc Flowsheet tab. BPP ordered due to non-reactive NST.

## 2021-12-07 NOTE — TELEPHONE ENCOUNTER
Please schedule pt for a nurse telephone visit and NPN provider appt.  She has one with [facility] buts wants something sooner if available.    Cici RITCHIE RN BSN

## 2021-12-07 NOTE — PROGRESS NOTES
"Please see \"Imaging\" tab under \"Chart Review\" for details of today's US at the [facility].    Rufino Collins MD  Maternal-Fetal Medicine    "

## 2021-12-07 NOTE — TELEPHONE ENCOUNTER
Will accept     Dr. Lima Bacon, DO    Obstetrics and Gynecology  [facility]

## 2021-12-07 NOTE — TELEPHONE ENCOUNTER
Pt is requesting to transfer care to [facility] from her previous clinic [facility] due to needing to deliver at [facility] rather than [facility]. Pt states she has been compliant with all prenatal appointments. Pt is 24 weeks or greater.  Last OV with current clinic: 21    Due for next OV : 2 weeks     2 Para 0  EDC 22, GA 32w1d    U/S done? Yes multiple    Previous C-sec? n/a    List all major health problems: none    List all complications of past deliveries (GDM, BP, etc):  n/a    List all current pregnancy issues:  Seeing MFM. Ascites, low AFL, fetal growth delay. AFL and ascites has resolved  1 hour glucose test: 102    List current medication list : PNV  Pt records: in epic    Per protocol, message routed to provider on-call for direction.

## 2021-12-10 ENCOUNTER — HOSPITAL ENCOUNTER (OUTPATIENT)
Dept: ULTRASOUND IMAGING | Facility: CLINIC | Age: 21
End: 2021-12-10
Attending: OBSTETRICS & GYNECOLOGY
Payer: COMMERCIAL

## 2021-12-10 ENCOUNTER — OFFICE VISIT (OUTPATIENT)
Dept: MATERNAL FETAL MEDICINE | Facility: CLINIC | Age: 21
End: 2021-12-10
Attending: OBSTETRICS & GYNECOLOGY
Payer: COMMERCIAL

## 2021-12-10 DIAGNOSIS — O36.5990 PREGNANCY AFFECTED BY FETAL GROWTH RESTRICTION: ICD-10-CM

## 2021-12-10 PROCEDURE — 59025 FETAL NON-STRESS TEST: CPT | Mod: 26 | Performed by: OBSTETRICS & GYNECOLOGY

## 2021-12-10 PROCEDURE — 59025 FETAL NON-STRESS TEST: CPT

## 2021-12-10 PROCEDURE — 76820 UMBILICAL ARTERY ECHO: CPT | Mod: 26 | Performed by: OBSTETRICS & GYNECOLOGY

## 2021-12-10 PROCEDURE — 76816 OB US FOLLOW-UP PER FETUS: CPT | Mod: 26 | Performed by: OBSTETRICS & GYNECOLOGY

## 2021-12-10 PROCEDURE — 76816 OB US FOLLOW-UP PER FETUS: CPT

## 2021-12-14 ENCOUNTER — OFFICE VISIT (OUTPATIENT)
Dept: MATERNAL FETAL MEDICINE | Facility: CLINIC | Age: 21
End: 2021-12-14
Attending: OBSTETRICS & GYNECOLOGY
Payer: COMMERCIAL

## 2021-12-14 ENCOUNTER — HOSPITAL ENCOUNTER (OUTPATIENT)
Dept: ULTRASOUND IMAGING | Facility: CLINIC | Age: 21
End: 2021-12-14
Attending: OBSTETRICS & GYNECOLOGY
Payer: COMMERCIAL

## 2021-12-14 DIAGNOSIS — O36.5990 PREGNANCY AFFECTED BY FETAL GROWTH RESTRICTION: ICD-10-CM

## 2021-12-14 PROCEDURE — 76815 OB US LIMITED FETUS(S): CPT

## 2021-12-14 PROCEDURE — 76820 UMBILICAL ARTERY ECHO: CPT | Mod: 26 | Performed by: OBSTETRICS & GYNECOLOGY

## 2021-12-14 PROCEDURE — 59025 FETAL NON-STRESS TEST: CPT | Mod: 26 | Performed by: OBSTETRICS & GYNECOLOGY

## 2021-12-14 PROCEDURE — 59025 FETAL NON-STRESS TEST: CPT

## 2021-12-14 PROCEDURE — 76815 OB US LIMITED FETUS(S): CPT | Mod: 26 | Performed by: OBSTETRICS & GYNECOLOGY

## 2021-12-14 NOTE — PROGRESS NOTES
"Please see \"Imaging\" tab under \"Chart Review\" for details of today's ultrasound.    Jad Fuller M.D.  Specialist in Maternal-Fetal Medicine     "

## 2021-12-14 NOTE — NURSING NOTE
Patient here for M ultrasound and reports that baby has been moving less than usual over the last couple days but does endorse fetal movement. Reviewed doing fetal kick counts a couple times a day and calling her provider or going in for evaluation if unable to meet the minimum requirements as listed on the education sheet. Patient reports baby was moving well while on fetal monitor. Reviewed above with Dr. Fuller who was in agreement to plan and no new orders received. Patient declines leaking, bleeding or feeling any U/C's. All patient questions answered. Encouraged good nutrition and hydration.

## 2021-12-15 ENCOUNTER — PRENATAL OFFICE VISIT (OUTPATIENT)
Dept: NURSING | Facility: CLINIC | Age: 21
End: 2021-12-15
Payer: MEDICAID

## 2021-12-15 DIAGNOSIS — Z34.80 PRENATAL CARE, SUBSEQUENT PREGNANCY: Primary | ICD-10-CM

## 2021-12-15 PROCEDURE — 99207 PR NO CHARGE NURSE ONLY: CPT

## 2021-12-15 RX ORDER — FLUTICASONE PROPIONATE 44 MCG
AEROSOL WITH ADAPTER (GRAM) INHALATION
COMMUNITY
Start: 2021-02-05 | End: 2021-12-31

## 2021-12-15 RX ORDER — ALBUTEROL SULFATE 90 UG/1
2 AEROSOL, METERED RESPIRATORY (INHALATION)
COMMUNITY
Start: 2020-12-18 | End: 2021-12-31
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
## 2021-12-17 ENCOUNTER — OFFICE VISIT (OUTPATIENT)
Dept: MATERNAL FETAL MEDICINE | Facility: CLINIC | Age: 21
End: 2021-12-17
Attending: OBSTETRICS & GYNECOLOGY
Payer: COMMERCIAL

## 2021-12-17 ENCOUNTER — HOSPITAL ENCOUNTER (OUTPATIENT)
Dept: ULTRASOUND IMAGING | Facility: CLINIC | Age: 21
End: 2021-12-17
Attending: OBSTETRICS & GYNECOLOGY
Payer: COMMERCIAL

## 2021-12-17 DIAGNOSIS — O36.5990 PREGNANCY AFFECTED BY FETAL GROWTH RESTRICTION: Primary | ICD-10-CM

## 2021-12-17 DIAGNOSIS — O36.5990 PREGNANCY AFFECTED BY FETAL GROWTH RESTRICTION: ICD-10-CM

## 2021-12-17 PROCEDURE — 76815 OB US LIMITED FETUS(S): CPT | Mod: 26 | Performed by: OBSTETRICS & GYNECOLOGY

## 2021-12-17 PROCEDURE — 59025 FETAL NON-STRESS TEST: CPT

## 2021-12-17 PROCEDURE — 59025 FETAL NON-STRESS TEST: CPT | Mod: 26 | Performed by: OBSTETRICS & GYNECOLOGY

## 2021-12-17 PROCEDURE — 76820 UMBILICAL ARTERY ECHO: CPT | Mod: 26 | Performed by: OBSTETRICS & GYNECOLOGY

## 2021-12-17 PROCEDURE — 76815 OB US LIMITED FETUS(S): CPT

## 2021-12-17 NOTE — PROGRESS NOTES
"Please see \"Imaging\" tab under \"Chart Review\" for details of today's US at the [facility].    Rufino Collins MD  Maternal-Fetal Medicine    "

## 2021-12-21 ENCOUNTER — PRENATAL OFFICE VISIT (OUTPATIENT)
Dept: OBGYN | Facility: CLINIC | Age: 21
End: 2021-12-21
Payer: COMMERCIAL

## 2021-12-21 VITALS
WEIGHT: 139 LBS | DIASTOLIC BLOOD PRESSURE: 68 MMHG | HEIGHT: 66 IN | SYSTOLIC BLOOD PRESSURE: 100 MMHG | BODY MASS INDEX: 22.34 KG/M2

## 2021-12-21 DIAGNOSIS — O35.9XX0 FETAL ABNORMALITY AFFECTING MANAGEMENT OF MOTHER, ANTEPARTUM, SINGLE OR UNSPECIFIED FETUS: ICD-10-CM

## 2021-12-21 DIAGNOSIS — O36.5930 POOR FETAL GROWTH AFFECTING MANAGEMENT OF MOTHER IN THIRD TRIMESTER, SINGLE OR UNSPECIFIED FETUS: Primary | ICD-10-CM

## 2021-12-21 PROCEDURE — 90471 IMMUNIZATION ADMIN: CPT | Performed by: OBSTETRICS & GYNECOLOGY

## 2021-12-21 PROCEDURE — 99212 OFFICE O/P EST SF 10 MIN: CPT | Performed by: OBSTETRICS & GYNECOLOGY

## 2021-12-21 PROCEDURE — 90715 TDAP VACCINE 7 YRS/> IM: CPT | Performed by: OBSTETRICS & GYNECOLOGY

## 2021-12-21 ASSESSMENT — MIFFLIN-ST. JEOR: SCORE: 1412.25

## 2021-12-21 NOTE — PROGRESS NOTES
JULIO from  in [place].  Reviewed her prenatal records.  Her pregnancy has been complicated by IUGR, transient fetal ascites which has since resolved, and has been seeing MFM here for several months.  Is commuting from [place], [place] as her insurance requires her to come here.  No c/o's.  Fetus active, no VB, SROM, UCs.  [facility] BPP 4 days ago showed 8/8, breech presentation.  Has weekly BPPs w/ MFM.  Will get TDaP today.  Recommended COVID vaccine but she declined.  Fetal movement counts BID,  labor/premature rupture of membranes precautions reviewed.  RTC 1 week(s).    Encounter Diagnoses   Name Primary?     Poor fetal growth affecting management of mother in third trimester, single or unspecified fetus Yes     Fetal abnormality affecting management of mother, antepartum, single or unspecified fetus      Encounter for maternal care for breech presentation in husain pregnancy        Risk factors listed above are stable and being addressed as noted.    Perico Pringle MD  [facility] [place]

## 2021-12-21 NOTE — NURSING NOTE
"Chief Complaint   Patient presents with     Prenatal Care     IUGR 34 1/7 weeks       Initial /68 (BP Location: Left arm, Patient Position: Chair, Cuff Size: Adult Regular)   Ht 1.676 m (5' 6\")   Wt 63 kg (139 lb)   LMP 2021   Breastfeeding No   BMI 22.44 kg/m   Estimated body mass index is 22.44 kg/m  as calculated from the following:    Height as of this encounter: 1.676 m (5' 6\").    Weight as of this encounter: 63 kg (139 lb).  BP completed using cuff size: regular    Questioned patient about current smoking habits.  Pt. has never smoked.          The following HM Due: NONE    +fetal movement  -swelling    Sakina Metcalf, CMA    "

## 2021-12-24 ENCOUNTER — OFFICE VISIT (OUTPATIENT)
Dept: MATERNAL FETAL MEDICINE | Facility: CLINIC | Age: 21
End: 2021-12-24
Attending: OBSTETRICS & GYNECOLOGY
Payer: COMMERCIAL

## 2021-12-24 ENCOUNTER — HOSPITAL ENCOUNTER (OUTPATIENT)
Dept: ULTRASOUND IMAGING | Facility: CLINIC | Age: 21
End: 2021-12-24
Attending: OBSTETRICS & GYNECOLOGY
Payer: COMMERCIAL

## 2021-12-24 DIAGNOSIS — O36.5990 PREGNANCY AFFECTED BY FETAL GROWTH RESTRICTION: ICD-10-CM

## 2021-12-24 DIAGNOSIS — O36.5990 PREGNANCY AFFECTED BY FETAL GROWTH RESTRICTION: Primary | ICD-10-CM

## 2021-12-24 PROCEDURE — 76820 UMBILICAL ARTERY ECHO: CPT

## 2021-12-24 PROCEDURE — 59025 FETAL NON-STRESS TEST: CPT | Mod: 26 | Performed by: OBSTETRICS & GYNECOLOGY

## 2021-12-24 PROCEDURE — 76815 OB US LIMITED FETUS(S): CPT | Mod: 26 | Performed by: OBSTETRICS & GYNECOLOGY

## 2021-12-24 PROCEDURE — 76820 UMBILICAL ARTERY ECHO: CPT | Mod: 26 | Performed by: OBSTETRICS & GYNECOLOGY

## 2021-12-24 PROCEDURE — 59025 FETAL NON-STRESS TEST: CPT

## 2021-12-24 NOTE — PROGRESS NOTES
Jessica Aide was seen for an ultrasound today at the Maternal-Fetal Medicine center.      For the details of the ultrasound please see the report which can be found under the imaging tab.      Jeannine Trevizo MD  , OB/GYN  Maternal-Fetal Medicine  [email or web address]  [phone] (Main M Office)  [phone] or [phone] (for 24 hour MFM questions)  [phone] (Pager)

## 2021-12-31 ENCOUNTER — PRENATAL OFFICE VISIT (OUTPATIENT)
Dept: OBGYN | Facility: CLINIC | Age: 21
End: 2021-12-31
Payer: COMMERCIAL

## 2021-12-31 ENCOUNTER — OFFICE VISIT (OUTPATIENT)
Dept: MATERNAL FETAL MEDICINE | Facility: CLINIC | Age: 21
End: 2021-12-31
Attending: OBSTETRICS & GYNECOLOGY
Payer: COMMERCIAL

## 2021-12-31 ENCOUNTER — HOSPITAL ENCOUNTER (OUTPATIENT)
Dept: ULTRASOUND IMAGING | Facility: CLINIC | Age: 21
End: 2021-12-31
Attending: OBSTETRICS & GYNECOLOGY
Payer: COMMERCIAL

## 2021-12-31 VITALS — WEIGHT: 141 LBS | SYSTOLIC BLOOD PRESSURE: 108 MMHG | BODY MASS INDEX: 22.76 KG/M2 | DIASTOLIC BLOOD PRESSURE: 64 MMHG

## 2021-12-31 DIAGNOSIS — O36.5990 PREGNANCY AFFECTED BY FETAL GROWTH RESTRICTION: ICD-10-CM

## 2021-12-31 DIAGNOSIS — O36.5930 POOR FETAL GROWTH AFFECTING MANAGEMENT OF MOTHER IN THIRD TRIMESTER, SINGLE OR UNSPECIFIED FETUS: Primary | ICD-10-CM

## 2021-12-31 DIAGNOSIS — O35.9XX0 FETAL ABNORMALITY AFFECTING MANAGEMENT OF MOTHER, ANTEPARTUM, SINGLE OR UNSPECIFIED FETUS: ICD-10-CM

## 2021-12-31 DIAGNOSIS — O36.5990 PREGNANCY AFFECTED BY FETAL GROWTH RESTRICTION: Primary | ICD-10-CM

## 2021-12-31 PROCEDURE — 99212 OFFICE O/P EST SF 10 MIN: CPT | Performed by: OBSTETRICS & GYNECOLOGY

## 2021-12-31 PROCEDURE — 76816 OB US FOLLOW-UP PER FETUS: CPT | Mod: 26 | Performed by: OBSTETRICS & GYNECOLOGY

## 2021-12-31 PROCEDURE — 59025 FETAL NON-STRESS TEST: CPT | Mod: 26 | Performed by: OBSTETRICS & GYNECOLOGY

## 2021-12-31 PROCEDURE — 76820 UMBILICAL ARTERY ECHO: CPT | Mod: 26 | Performed by: OBSTETRICS & GYNECOLOGY

## 2021-12-31 PROCEDURE — 59025 FETAL NON-STRESS TEST: CPT

## 2021-12-31 PROCEDURE — 76820 UMBILICAL ARTERY ECHO: CPT

## 2021-12-31 NOTE — PROGRESS NOTES
No c/o's.  Has MFM U/S today for growth and BPP.  Has BPPs twice weekly for IUGR.  Pt states her baby does not feel like it has flipped, and is still breech.  I reviewed delivery options w/ Pt (ECV attempt vs C/S) and given her baby is IUGR and at one point had concerning dopplers (that have since normalized), she does not want to do an ECV attempt, and I agree with her.  Therefore will plan to schedule C/S if baby stays breech.  She will be scheduled between 37+w - 39+w depending on [facility] recommendation on timing of delivery.  Will await their report today to determine this and then schedule accordingly.  GBS, ERAS info, and Preop at next visit. Fetal movement counts BID,  labor/premature rupture of membranes precautions reviewed.  RTC 1 week(s).    Encounter Diagnoses   Name Primary?     Poor fetal growth affecting management of mother in third trimester, single or unspecified fetus Yes     Fetal abnormality affecting management of mother, antepartum, single or unspecified fetus      Encounter for maternal care for breech presentation in husain pregnancy        Risk factors listed above are stable and being addressed as noted.    Perico Pringle MD  [facility] WOMEN'S [facility]

## 2021-12-31 NOTE — PROGRESS NOTES
The patient was seen for an ultrasound in the Maternal-Fetal Medicine Center at the [facility] today.  For a detailed report of the ultrasound examination, please see the ultrasound report which can be found under the imaging tab.    Naheed Mauricio MD  , OB/GYN  Maternal-Fetal Medicine  [phone] (Pager)

## 2021-12-31 NOTE — NURSING NOTE
"Chief Complaint   Patient presents with     Prenatal Care   35w4d    initial /64   Wt 64 kg (141 lb)   LMP 04/26/2021   BMI 22.76 kg/m   Estimated body mass index is 22.76 kg/m  as calculated from the following:    Height as of 12/21/21: 1.676 m (5' 6\").    Weight as of this encounter: 64 kg (141 lb).  BP completed using cuff size regular.  Do Chery CMA    "

## 2022-01-03 ENCOUNTER — NURSE TRIAGE (OUTPATIENT)
Dept: NURSING | Facility: CLINIC | Age: 22
End: 2022-01-03
Payer: COMMERCIAL

## 2022-01-04 ENCOUNTER — TELEPHONE (OUTPATIENT)
Dept: OBGYN | Facility: CLINIC | Age: 22
End: 2022-01-04

## 2022-01-04 ENCOUNTER — PREP FOR PROCEDURE (OUTPATIENT)
Dept: OBGYN | Facility: CLINIC | Age: 22
End: 2022-01-04

## 2022-01-04 DIAGNOSIS — O36.5990 PREGNANCY AFFECTED BY FETAL GROWTH RESTRICTION: ICD-10-CM

## 2022-01-04 DIAGNOSIS — Z11.59 ENCOUNTER FOR SCREENING FOR OTHER VIRAL DISEASES: ICD-10-CM

## 2022-01-04 RX ORDER — SODIUM CHLORIDE, SODIUM LACTATE, POTASSIUM CHLORIDE, CALCIUM CHLORIDE 600; 310; 30; 20 MG/100ML; MG/100ML; MG/100ML; MG/100ML
INJECTION, SOLUTION INTRAVENOUS CONTINUOUS
Status: CANCELLED | OUTPATIENT
Start: 2022-01-04

## 2022-01-04 RX ORDER — OXYTOCIN/0.9 % SODIUM CHLORIDE 30/500 ML
100-340 PLASTIC BAG, INJECTION (ML) INTRAVENOUS CONTINUOUS PRN
Status: CANCELLED | OUTPATIENT
Start: 2022-01-04

## 2022-01-04 RX ORDER — CEFAZOLIN SODIUM 2 G/100ML
2 INJECTION, SOLUTION INTRAVENOUS
Status: CANCELLED | OUTPATIENT
Start: 2022-01-04

## 2022-01-04 RX ORDER — MISOPROSTOL 200 UG/1
800 TABLET ORAL
Status: CANCELLED | OUTPATIENT
Start: 2022-01-04

## 2022-01-04 RX ORDER — CEFAZOLIN SODIUM 2 G/100ML
2 INJECTION, SOLUTION INTRAVENOUS SEE ADMIN INSTRUCTIONS
Status: CANCELLED | OUTPATIENT
Start: 2022-01-04

## 2022-01-04 RX ORDER — OXYTOCIN/0.9 % SODIUM CHLORIDE 30/500 ML
340 PLASTIC BAG, INJECTION (ML) INTRAVENOUS CONTINUOUS PRN
Status: CANCELLED | OUTPATIENT
Start: 2022-01-04

## 2022-01-04 RX ORDER — CARBOPROST TROMETHAMINE 250 UG/ML
250 INJECTION, SOLUTION INTRAMUSCULAR
Status: CANCELLED | OUTPATIENT
Start: 2022-01-04

## 2022-01-04 RX ORDER — OXYTOCIN 10 [USP'U]/ML
10 INJECTION, SOLUTION INTRAMUSCULAR; INTRAVENOUS
Status: CANCELLED | OUTPATIENT
Start: 2022-01-04

## 2022-01-04 RX ORDER — METHYLERGONOVINE MALEATE 0.2 MG/ML
200 INJECTION INTRAVENOUS
Status: CANCELLED | OUTPATIENT
Start: 2022-01-04

## 2022-01-04 RX ORDER — ACETAMINOPHEN 325 MG/1
975 TABLET ORAL ONCE
Status: CANCELLED | OUTPATIENT
Start: 2022-01-04 | End: 2022-01-04

## 2022-01-04 RX ORDER — TRANEXAMIC ACID 10 MG/ML
1 INJECTION, SOLUTION INTRAVENOUS EVERY 30 MIN PRN
Status: CANCELLED | OUTPATIENT
Start: 2022-01-04

## 2022-01-04 RX ORDER — MISOPROSTOL 200 UG/1
400 TABLET ORAL
Status: CANCELLED | OUTPATIENT
Start: 2022-01-04

## 2022-01-04 RX ORDER — CLINDAMYCIN PHOSPHATE 900 MG/50ML
900 INJECTION, SOLUTION INTRAVENOUS
Status: CANCELLED | OUTPATIENT
Start: 2022-01-04

## 2022-01-04 RX ORDER — LIDOCAINE 40 MG/G
CREAM TOPICAL
Status: CANCELLED | OUTPATIENT
Start: 2022-01-04

## 2022-01-04 RX ORDER — CITRIC ACID/SODIUM CITRATE 334-500MG
30 SOLUTION, ORAL ORAL
Status: CANCELLED | OUTPATIENT
Start: 2022-01-04

## 2022-01-04 NOTE — TELEPHONE ENCOUNTER
Jessica calls and says that she is 36 weeks pregnant and has had left upper and middle abdominal pain. Pt. Says that the pain has been sharp and = 8/10. Pt. Also says that she has had yellow vaginal discharge today. Denies any vaginal bleeding and denies any leakage of vaginal fluid. Pt. Says that she is not going to call 911. Pt. Says that she will have someone take her to the [facility] ER now. COVID 19 Nurse Triage Plan/Patient Instructions    Please be aware that novel coronavirus (COVID-19) may be circulating in the community. If you develop symptoms such as fever, cough, or SOB or if you have concerns about the presence of another infection including coronavirus (COVID-19), please contact your health care provider or visit [email or web address].     Disposition/Instructions    Call to EMS/911 recommended. Follow protocol based instructions.     Bring Your Own Device:  Please also bring your smart device(s) (smart phones, tablets, laptops) and their charging cables for your personal use and to communicate with your care team during your visit.    Thank you for taking steps to prevent the spread of this virus.  o Limit your contact with others.  o Wear a simple mask to cover your cough.  o Wash your hands well and often.    Resources    [facility]: About COVID-19: [email or web address]    CDC: What to Do If You're Sick: www.cdc.gov/coronavirus/2019-ncov/about/steps-when-sick.html    CDC: Ending Home Isolation: www.cdc.gov/coronavirus/2019-ncov/hcp/disposition-in-home-patients.html     CDC: Caring for Someone: www.cdc.gov/coronavirus/2019-ncov/if-you-are-sick/care-for-someone.html     [facility]: Interim Guidance for Hospital Discharge to Home: www.health.On [facility] of [facility].[place]/diseases/coronavirus/hcp/hospdischarge.pdf    [facility] clinical trials (COVID-19 research studies): [email or web address].[email or web address]/umn-clinical-trials     Below are the COVID-19 hotlines at the [place]  Department of Health ([facility]). Interpreters are available.   o For health questions: Call 837-257-3387 or 1-772.479.1892 (7 a.m. to 7 p.m.)  o For questions about schools and childcare: Call 457-936-9419 or 1-847.252.2939 (7 a.m. to 7 p.m.)                   Reason for Disposition    Difficult to awaken or acting confused (e.g., disoriented, slurred speech)    Additional Information    Negative: Passed out (i.e., lost consciousness, collapsed and was not responding)    Negative: Shock suspected (e.g., cold/pale/clammy skin, too weak to stand, low BP, rapid pulse)    Protocols used: PREGNANCY - LABOR-A-AH

## 2022-01-04 NOTE — TELEPHONE ENCOUNTER
Procedure name(s) - multi select  section    Reason for procedure Breech presentation, IUGR    Surgeon: Pino    Is this a multi surgeon case? No    Laterality N/A    Request for additional equipment Other (see comments) None   Anesthesia Spinal    Initiate Pre-op orders for above procedure: Yes, as ordered in Epic Additional orders noted there also   Location of Case: [facility]    Urgency of Surgery: Routine    Surgeon Procedure Time (incision to closure) in minutes (per procedure as applicable) 90    Note: Surgical Case Time Needed (in minutes)   Date of Surgery (Requested): 2022 Mon   Patient Class (for admit prior to surgery, specify number of days in comments): Surgery admit    Length of Stay: Other (see comments) 2-3 days   H&P To Be Completed By: Surgeon    Post-Op Appointment 6 weeks    Vendor Needed? No

## 2022-01-04 NOTE — TELEPHONE ENCOUNTER
Type of surgery:  section  Location of surgery: [facility]  Date and time of surgery: 22 @ 9:00 am  Surgeon: Dr. Pringle  Pre-Op Appt Date: @ prenatal appointment  Post-Op Appt Date: Patient advised to schedule.   Packet sent out: Yes  Pre-cert/Authorization completed:  No  Date: 22

## 2022-01-07 ENCOUNTER — PRENATAL OFFICE VISIT (OUTPATIENT)
Dept: OBGYN | Facility: CLINIC | Age: 22
End: 2022-01-07
Payer: COMMERCIAL

## 2022-01-07 ENCOUNTER — OFFICE VISIT (OUTPATIENT)
Dept: MATERNAL FETAL MEDICINE | Facility: CLINIC | Age: 22
End: 2022-01-07
Attending: OBSTETRICS & GYNECOLOGY
Payer: COMMERCIAL

## 2022-01-07 ENCOUNTER — TELEPHONE (OUTPATIENT)
Dept: OBGYN | Facility: CLINIC | Age: 22
End: 2022-01-07

## 2022-01-07 ENCOUNTER — HOSPITAL ENCOUNTER (OUTPATIENT)
Dept: ULTRASOUND IMAGING | Facility: CLINIC | Age: 22
End: 2022-01-07
Attending: OBSTETRICS & GYNECOLOGY
Payer: COMMERCIAL

## 2022-01-07 VITALS — BODY MASS INDEX: 23.18 KG/M2 | DIASTOLIC BLOOD PRESSURE: 70 MMHG | WEIGHT: 143.6 LBS | SYSTOLIC BLOOD PRESSURE: 108 MMHG

## 2022-01-07 DIAGNOSIS — O35.9XX0 FETAL ABNORMALITY AFFECTING MANAGEMENT OF MOTHER, ANTEPARTUM, SINGLE OR UNSPECIFIED FETUS: ICD-10-CM

## 2022-01-07 DIAGNOSIS — O36.5930 POOR FETAL GROWTH AFFECTING MANAGEMENT OF MOTHER IN THIRD TRIMESTER, SINGLE OR UNSPECIFIED FETUS: ICD-10-CM

## 2022-01-07 DIAGNOSIS — N89.8 VAGINAL DISCHARGE DURING PREGNANCY IN THIRD TRIMESTER: Primary | ICD-10-CM

## 2022-01-07 DIAGNOSIS — O36.5990 PREGNANCY AFFECTED BY FETAL GROWTH RESTRICTION: ICD-10-CM

## 2022-01-07 DIAGNOSIS — O26.893 VAGINAL DISCHARGE DURING PREGNANCY IN THIRD TRIMESTER: Primary | ICD-10-CM

## 2022-01-07 LAB — CRYSTALS AMN MICRO: NORMAL

## 2022-01-07 PROCEDURE — 59025 FETAL NON-STRESS TEST: CPT | Mod: 26 | Performed by: OBSTETRICS & GYNECOLOGY

## 2022-01-07 PROCEDURE — 76820 UMBILICAL ARTERY ECHO: CPT | Mod: 26 | Performed by: OBSTETRICS & GYNECOLOGY

## 2022-01-07 PROCEDURE — 59025 FETAL NON-STRESS TEST: CPT

## 2022-01-07 PROCEDURE — 76815 OB US LIMITED FETUS(S): CPT | Mod: 26 | Performed by: OBSTETRICS & GYNECOLOGY

## 2022-01-07 PROCEDURE — 76815 OB US LIMITED FETUS(S): CPT

## 2022-01-07 PROCEDURE — 99212 OFFICE O/P EST SF 10 MIN: CPT | Performed by: OBSTETRICS & GYNECOLOGY

## 2022-01-07 PROCEDURE — 87653 STREP B DNA AMP PROBE: CPT | Performed by: OBSTETRICS & GYNECOLOGY

## 2022-01-07 NOTE — NURSING NOTE
"Chief Complaint   Patient presents with     Prenatal Care     36 weeks 4 days   GBS due   Fern today        Initial /70 (BP Location: Right arm, Cuff Size: Adult Regular)   Wt 65.1 kg (143 lb 9.6 oz)   LMP 2021   Breastfeeding No   BMI 23.18 kg/m   Estimated body mass index is 23.18 kg/m  as calculated from the following:    Height as of 21: 1.676 m (5' 6\").    Weight as of this encounter: 65.1 kg (143 lb 9.6 oz).  BP completed using cuff size: regular    Questioned patient about current smoking habits.  Pt. has never smoked.    36w4d        The following HM Due: NONE      +FM daily   +Felt like Leaking fluid on Tuesday   +ED on 1/3/2022 for epigastric pain        Yaima Lynn, CMA on 2022 at 3:38 PM      "

## 2022-01-07 NOTE — NURSING NOTE
NST Performed due to FGR.  Dr. Molina reviewed efm tracing. See NST/BPP Doc Flowsheet tab. Patient reports being evaluated in [place] on 1/3/22 for upper abdominal pain. SROM ruled out along with wet prep and CBC. Patient discharged to home. [place] LOF on 1/4 and 1/5. Described as clear and watery. Did not notify OB or get this checked out. Denies LOF the last two days. Reports fetal movement. Has had some tightening and cramping intermittently. Has OB appointment following today's US appointment. Reviewed with patient need to rule out SROM. Patient verbalizes understanding. OB clinic updated regarding leaking of fluid. Dr Pringle planning to do FERN at appointment today.

## 2022-01-07 NOTE — PROGRESS NOTES
Pt was in L&D at [place], [place] 1/3/2022 due to possible leaking fluid, also some upper abd pain under ribs.  Was observed and r/o'd for SROM, PTL, and fetus was doing well.  She was d/c'd.  Three days ago (the day after that L&D visit) she had another gush of clear fluid, then only a couple drips throughout the next day (2 days ago).  Today is not leaking.  No VB, no UCs.  Fetus active.  MFM U/S today showed normal Dopplers, normal AFV, and still breech.  MFM also did an NST that was reactive.  SSE-Pool Neg, Fern sent. GBS done.  Assuming Fern is neg then Fetal movement counts BID,  labor/premature rupture of membranes precautions reviewed.  RTC 1 week(s).  Preop at next visit.  Has weekly Dopplers w/ MFM.  Pt knows not to go back home until Fern is back and she is notified that it is negative.    Encounter Diagnoses   Name Primary?     Vaginal discharge during pregnancy in third trimester Yes     Encounter for maternal care for breech presentation in husain pregnancy      Poor fetal growth affecting management of mother in third trimester, single or unspecified fetus      Fetal abnormality affecting management of mother, antepartum, single or unspecified fetus        Risk factors listed above are stable and being addressed as noted.    Perico Pringle MD  [facility]OMEN[facility]IN[facility] [place]

## 2022-01-08 LAB
GP B STREP DNA SPEC QL NAA+PROBE: NEGATIVE
PATIENT PENICILLIN, AMOXICILLIN, CEPHALOSPORINS ALLERGY: YES

## 2022-01-09 ENCOUNTER — MYC MEDICAL ADVICE (OUTPATIENT)
Dept: OBGYN | Facility: CLINIC | Age: 22
End: 2022-01-09
Payer: COMMERCIAL

## 2022-01-10 RX ORDER — BREAST PUMP
1 EACH MISCELLANEOUS DAILY
Qty: 1 EACH | Refills: 0 | Status: SHIPPED | OUTPATIENT
Start: 2022-01-10

## 2022-01-10 NOTE — TELEPHONE ENCOUNTER
Please address the my chart message.  rx for breast pump pended. Needs to be signed by md and then hand faxed to [facility].  ALHAJI Wallace RN

## 2022-01-11 VITALS — HEIGHT: 66 IN | BODY MASS INDEX: 22.98 KG/M2 | WEIGHT: 143 LBS

## 2022-01-11 ASSESSMENT — MIFFLIN-ST. JEOR: SCORE: 1425.39

## 2022-01-12 NOTE — TELEPHONE ENCOUNTER
Pt calling regarding this.  States [facility] did not receive fax.  Re-printed and faxed.    Nori Silva RN

## 2022-01-14 ENCOUNTER — MYC MEDICAL ADVICE (OUTPATIENT)
Dept: OBGYN | Facility: CLINIC | Age: 22
End: 2022-01-14

## 2022-01-14 NOTE — H&P
"  2022    Jessica Noble  [id number]            OB Preop History & Physical      Ms. Noble  is here for C/S for breech presentation and IUGR.    She has noticed no VB, SROM, UCs.  Fetus active.    Patient's last menstrual period was 2021.   Her Estimated Date of Delivery: 2022, making her 38w0d.      Estimated body mass index is 23.08 kg/m  as calculated from the following:    Height as of this encounter: 1.676 m (5' 6\").    Weight as of this encounter: 64.9 kg (143 lb).  Her prenatal course has been w/ me after JULIO from [facility] complicated by persistent breech presentation and not a candidate nor desires ECV attempt due to IUGR, f/u'd by MFM w/ last U/S 35w 7% (AC 5%), Dopplers weekly.    See prenatal for labs.  Neg GBS, Rubella Immune, RH Positive         She is a 21 year old   Her OB history:   OB History    Para Term  AB Living   2 0 0 0 1 0   SAB IAB Ectopic Multiple Live Births   0 1 0 0 0      # Outcome Date GA Lbr Jair/2nd Weight Sex Delivery Anes PTL Lv   2 Current            1 IAB      IAB               Past Medical History:   Diagnosis Date     Depressive disorder     suicide attempt 2021, in patient, therapy     Gastritis 10/2020     Irritable bowel syndrome 10/2020     Low iron     history of in      Uncomplicated asthma     No Hosp; Albuterol prn and flovent daily          Past Surgical History:   Procedure Laterality Date     ENDOSCOPY       ENT SURGERY      tube as a child         Current Outpatient Medications   Medication Sig Dispense Refill     polyethylene glycol (MIRALAX) 17 GM/Dose powder Take 1.5 capfuls by mouth daily 2 capfuls daily       Prenatal Vit-Fe Fumarate-FA (PRENATAL MULTIVITAMIN W/IRON) 27-0.8 MG tablet Take 1 tablet by mouth daily       Misc. Devices (BREAST PUMP) MISC 1 each daily 1 each 0       Allergies: Augmentin, Coconut fatty acids, Coconut oil, Kiwi, Kiwi extract, Penicillins, Triamcinolone, and Lactose      REVIEW OF " "SYSTEMS:  NEUROLOGIC:  Negative  EYES:  Negative  ENT:  Negative  GI:  Negative  :  Negative  GYN:  Negative  CV:  Negative  PULMONARY:  Negative  MUSCULOSKELETAL:  Negative  PSYCH:  Negative        Social History     Socioeconomic History     Marital status: Single     Spouse name: Not on file     Number of children: Not on file     Years of education: Not on file     Highest education level: Not on file   Occupational History     Occupation: Behavioral Specialist   Tobacco Use     Smoking status: Never Smoker     Smokeless tobacco: Never Used   Vaping Use     Vaping Use: Never used   Substance and Sexual Activity     Alcohol use: Not Currently     Drug use: Never     Sexual activity: Yes     Partners: Male     Birth control/protection: None   Other Topics Concern     Not on file   Social History Narrative     Not on file     Social Determinants of Health     Financial Resource Strain: Not on file   Food Insecurity: Not on file   Transportation Needs: Not on file   Physical Activity: Not on file   Stress: Not on file   Social Connections: Not on file   Intimate Partner Violence: Not on file   Housing Stability: Not on file      Family History   Problem Relation Age of Onset     Breast Cancer Mother      Skin Cancer Mother      Breast Cancer Maternal Grandmother          Exam:    Vitals:   Ht 1.676 m (5' 6\")   Wt 64.9 kg (143 lb)   LMP 04/26/2021   BMI 23.08 kg/m      Gen- Alert Awake in NAD  HEENT grossly normal  Neck: no lymphadenopathy or thryoidomegaly  Lungs CTAB  Back No CVAT  Heart RR  ABD gravid, NABS, soft, NT on exam with NT fundus palpable  EXT:  No edema, no calf tenderness      Assessment:    1)  IUP at 38w0d  2)  Breech presentation - for C/S  3)  IUGR  4)  GBS Neg    Plan:    1)  Proceed w/ LTCS  2)  She understands the indications/risks/benefits/alternatives of the proposed procedure and has given informed consent.      Perico Pringle MD  January 14, 2022     "

## 2022-01-14 NOTE — TELEPHONE ENCOUNTER
Spoke with predamitting.  Pt OK to go up to L&D for COVID swab after lab draw on Sunday.  They will be expecting her on Sunday.    Pt advised.    Nori Silva RN

## 2022-01-14 NOTE — TELEPHONE ENCOUNTER
I spoke w/Pt:  I reviewed her C/S plan and preop instructions.  However, she has not had her preop COVID test done yet, and was planning on getting it done on Sunday when she comes in for her preop labs.  I advised Pt that the hospital policy usually required COVID testing 3-4 days before admission which means she would have needed it done today, and probably no later than tomorrow to ensure compliance and results are back before Monday morning.  However her situation and the fact the COVID testing has usually returned results in only a few hours may make it doable for her to wait until Sunday.  If not, she will need to get it done in the window that the hospital requires for us to proceed on Monday.  Please confirm if the Pt can get her COVID testing on Sunday, or if she needs it done sooner, and advise Pt.    Also advise her that the hospital policy just changed to only allow 1 visitor for OB Pt's (she doesn't have a ).

## 2022-01-16 ENCOUNTER — HOSPITAL ENCOUNTER (OUTPATIENT)
Facility: CLINIC | Age: 22
Discharge: HOME | End: 2022-01-16
Attending: OBSTETRICS & GYNECOLOGY | Admitting: OBSTETRICS & GYNECOLOGY
Payer: COMMERCIAL

## 2022-01-16 ENCOUNTER — LAB (OUTPATIENT)
Dept: LAB | Facility: CLINIC | Age: 22
End: 2022-01-16
Payer: COMMERCIAL

## 2022-01-16 DIAGNOSIS — Z01.812 PRE-OPERATIVE LABORATORY EXAMINATION: ICD-10-CM

## 2022-01-16 LAB
ABO/RH(D): NORMAL
ANTIBODY SCREEN: NEGATIVE
HGB BLD-MCNC: 13.7 G/DL (ref 11.7–15.7)
SARS-COV-2 RNA RESP QL NAA+PROBE: NEGATIVE
SPECIMEN EXPIRATION DATE: NORMAL

## 2022-01-16 PROCEDURE — 86780 TREPONEMA PALLIDUM: CPT

## 2022-01-16 PROCEDURE — 36415 COLL VENOUS BLD VENIPUNCTURE: CPT

## 2022-01-16 PROCEDURE — 85018 HEMOGLOBIN: CPT

## 2022-01-16 PROCEDURE — 86901 BLOOD TYPING SEROLOGIC RH(D): CPT

## 2022-01-16 PROCEDURE — 87635 SARS-COV-2 COVID-19 AMP PRB: CPT | Performed by: OBSTETRICS & GYNECOLOGY

## 2022-01-16 PROCEDURE — C9803 HOPD COVID-19 SPEC COLLECT: HCPCS

## 2022-01-16 PROCEDURE — 120N000001 HC R&B MED SURG/OB

## 2022-01-17 ENCOUNTER — HOSPITAL ENCOUNTER (INPATIENT)
Facility: CLINIC | Age: 22
LOS: 2 days | Discharge: HOME | End: 2022-01-19
Attending: OBSTETRICS & GYNECOLOGY | Admitting: OBSTETRICS & GYNECOLOGY
Payer: COMMERCIAL

## 2022-01-17 ENCOUNTER — ANESTHESIA (OUTPATIENT)
Dept: OBGYN | Facility: CLINIC | Age: 22
End: 2022-01-17
Payer: COMMERCIAL

## 2022-01-17 ENCOUNTER — ANESTHESIA EVENT (OUTPATIENT)
Dept: OBGYN | Facility: CLINIC | Age: 22
End: 2022-01-17
Payer: COMMERCIAL

## 2022-01-17 PROBLEM — Q51.3 BICORNUATE UTERUS DURING PREGNANCY, DELIVERED, CURRENT HOSPITALIZATION: Status: ACTIVE | Noted: 2022-01-17

## 2022-01-17 PROBLEM — O36.5990: Status: ACTIVE | Noted: 2022-01-17

## 2022-01-17 PROBLEM — Q51.3: Chronic | Status: ACTIVE | Noted: 2022-01-17

## 2022-01-17 PROBLEM — O36.5930 POOR FETAL GROWTH AFFECTING MANAGEMENT OF MOTHER IN THIRD TRIMESTER: Status: RESOLVED | Noted: 2021-12-21 | Resolved: 2022-01-17

## 2022-01-17 PROBLEM — O34.00 BICORNUATE UTERUS DURING PREGNANCY, DELIVERED, CURRENT HOSPITALIZATION: Status: ACTIVE | Noted: 2022-01-17

## 2022-01-17 PROBLEM — O35.9XX0 FETAL ABNORMALITY AFFECTING MANAGEMENT OF MOTHER, ANTEPARTUM, SINGLE OR UNSPECIFIED FETUS: Status: RESOLVED | Noted: 2021-10-07 | Resolved: 2022-01-17

## 2022-01-17 LAB — T PALLIDUM AB SER QL: NONREACTIVE

## 2022-01-17 PROCEDURE — 250N000013 HC RX MED GY IP 250 OP 250 PS 637: Performed by: ANESTHESIOLOGY

## 2022-01-17 PROCEDURE — 59515 CESAREAN DELIVERY: CPT | Performed by: OBSTETRICS & GYNECOLOGY

## 2022-01-17 PROCEDURE — 88307 TISSUE EXAM BY PATHOLOGIST: CPT | Mod: TC | Performed by: OBSTETRICS & GYNECOLOGY

## 2022-01-17 PROCEDURE — 120N000001 HC R&B MED SURG/OB

## 2022-01-17 PROCEDURE — 258N000003 HC RX IP 258 OP 636: Performed by: OBSTETRICS & GYNECOLOGY

## 2022-01-17 PROCEDURE — 250N000011 HC RX IP 250 OP 636: Performed by: NURSE ANESTHETIST, CERTIFIED REGISTERED

## 2022-01-17 PROCEDURE — 250N000011 HC RX IP 250 OP 636: Performed by: OBSTETRICS & GYNECOLOGY

## 2022-01-17 PROCEDURE — 250N000009 HC RX 250: Performed by: NURSE ANESTHETIST, CERTIFIED REGISTERED

## 2022-01-17 PROCEDURE — 370N000017 HC ANESTHESIA TECHNICAL FEE, PER MIN: Performed by: OBSTETRICS & GYNECOLOGY

## 2022-01-17 PROCEDURE — 250N000009 HC RX 250: Performed by: OBSTETRICS & GYNECOLOGY

## 2022-01-17 PROCEDURE — 272N000001 HC OR GENERAL SUPPLY STERILE: Performed by: OBSTETRICS & GYNECOLOGY

## 2022-01-17 PROCEDURE — 710N000009 HC RECOVERY PHASE 1, LEVEL 1, PER MIN: Performed by: OBSTETRICS & GYNECOLOGY

## 2022-01-17 PROCEDURE — 360N000076 HC SURGERY LEVEL 3, PER MIN: Performed by: OBSTETRICS & GYNECOLOGY

## 2022-01-17 PROCEDURE — 250N000013 HC RX MED GY IP 250 OP 250 PS 637: Performed by: OBSTETRICS & GYNECOLOGY

## 2022-01-17 RX ORDER — METOCLOPRAMIDE HYDROCHLORIDE 5 MG/ML
10 INJECTION INTRAMUSCULAR; INTRAVENOUS EVERY 6 HOURS PRN
Status: DISCONTINUED | OUTPATIENT
Start: 2022-01-17 | End: 2022-01-19 | Stop reason: HOSPADM

## 2022-01-17 RX ORDER — METHYLERGONOVINE MALEATE 0.2 MG/ML
200 INJECTION INTRAVENOUS
Status: DISCONTINUED | OUTPATIENT
Start: 2022-01-17 | End: 2022-01-19 | Stop reason: HOSPADM

## 2022-01-17 RX ORDER — NALOXONE HYDROCHLORIDE 0.4 MG/ML
0.2 INJECTION, SOLUTION INTRAMUSCULAR; INTRAVENOUS; SUBCUTANEOUS
Status: DISCONTINUED | OUTPATIENT
Start: 2022-01-17 | End: 2022-01-17

## 2022-01-17 RX ORDER — MORPHINE SULFATE 1 MG/ML
INJECTION, SOLUTION EPIDURAL; INTRATHECAL; INTRAVENOUS PRN
Status: DISCONTINUED | OUTPATIENT
Start: 2022-01-17 | End: 2022-01-17

## 2022-01-17 RX ORDER — METOCLOPRAMIDE 10 MG/1
10 TABLET ORAL EVERY 6 HOURS PRN
Status: DISCONTINUED | OUTPATIENT
Start: 2022-01-17 | End: 2022-01-19 | Stop reason: HOSPADM

## 2022-01-17 RX ORDER — PROCHLORPERAZINE MALEATE 10 MG
10 TABLET ORAL EVERY 6 HOURS PRN
Status: DISCONTINUED | OUTPATIENT
Start: 2022-01-17 | End: 2022-01-19 | Stop reason: HOSPADM

## 2022-01-17 RX ORDER — MISOPROSTOL 200 UG/1
400 TABLET ORAL
Status: DISCONTINUED | OUTPATIENT
Start: 2022-01-17 | End: 2022-01-19 | Stop reason: HOSPADM

## 2022-01-17 RX ORDER — CARBOPROST TROMETHAMINE 250 UG/ML
250 INJECTION, SOLUTION INTRAMUSCULAR
Status: DISCONTINUED | OUTPATIENT
Start: 2022-01-17 | End: 2022-01-17 | Stop reason: HOSPADM

## 2022-01-17 RX ORDER — OXYCODONE HYDROCHLORIDE 5 MG/1
5 TABLET ORAL EVERY 4 HOURS PRN
Status: DISCONTINUED | OUTPATIENT
Start: 2022-01-17 | End: 2022-01-19 | Stop reason: HOSPADM

## 2022-01-17 RX ORDER — SIMETHICONE 80 MG
80 TABLET,CHEWABLE ORAL 4 TIMES DAILY PRN
Status: DISCONTINUED | OUTPATIENT
Start: 2022-01-17 | End: 2022-01-19 | Stop reason: HOSPADM

## 2022-01-17 RX ORDER — CARBOPROST TROMETHAMINE 250 UG/ML
250 INJECTION, SOLUTION INTRAMUSCULAR
Status: DISCONTINUED | OUTPATIENT
Start: 2022-01-17 | End: 2022-01-19 | Stop reason: HOSPADM

## 2022-01-17 RX ORDER — ONDANSETRON 4 MG/1
4 TABLET, ORALLY DISINTEGRATING ORAL EVERY 6 HOURS PRN
Status: DISCONTINUED | OUTPATIENT
Start: 2022-01-17 | End: 2022-01-19 | Stop reason: HOSPADM

## 2022-01-17 RX ORDER — MISOPROSTOL 200 UG/1
800 TABLET ORAL
Status: DISCONTINUED | OUTPATIENT
Start: 2022-01-17 | End: 2022-01-17 | Stop reason: HOSPADM

## 2022-01-17 RX ORDER — TRANEXAMIC ACID 10 MG/ML
1 INJECTION, SOLUTION INTRAVENOUS EVERY 30 MIN PRN
Status: DISCONTINUED | OUTPATIENT
Start: 2022-01-17 | End: 2022-01-19 | Stop reason: HOSPADM

## 2022-01-17 RX ORDER — NALOXONE HYDROCHLORIDE 0.4 MG/ML
0.4 INJECTION, SOLUTION INTRAMUSCULAR; INTRAVENOUS; SUBCUTANEOUS
Status: DISCONTINUED | OUTPATIENT
Start: 2022-01-17 | End: 2022-01-17

## 2022-01-17 RX ORDER — AMOXICILLIN 250 MG
2 CAPSULE ORAL 2 TIMES DAILY
Status: DISCONTINUED | OUTPATIENT
Start: 2022-01-17 | End: 2022-01-19 | Stop reason: HOSPADM

## 2022-01-17 RX ORDER — IBUPROFEN 600 MG/1
600 TABLET, FILM COATED ORAL EVERY 6 HOURS
Status: DISCONTINUED | OUTPATIENT
Start: 2022-01-18 | End: 2022-01-19 | Stop reason: HOSPADM

## 2022-01-17 RX ORDER — FENTANYL CITRATE-0.9 % NACL/PF 10 MCG/ML
PLASTIC BAG, INJECTION (ML) INTRAVENOUS CONTINUOUS PRN
Status: DISCONTINUED | OUTPATIENT
Start: 2022-01-17 | End: 2022-01-17

## 2022-01-17 RX ORDER — ACETAMINOPHEN 325 MG/1
975 TABLET ORAL ONCE
Status: COMPLETED | OUTPATIENT
Start: 2022-01-17 | End: 2022-01-17

## 2022-01-17 RX ORDER — LIDOCAINE 40 MG/G
CREAM TOPICAL
Status: DISCONTINUED | OUTPATIENT
Start: 2022-01-17 | End: 2022-01-17 | Stop reason: HOSPADM

## 2022-01-17 RX ORDER — EPHEDRINE SULFATE 50 MG/ML
5 INJECTION, SOLUTION INTRAMUSCULAR; INTRAVENOUS; SUBCUTANEOUS
Status: DISCONTINUED | OUTPATIENT
Start: 2022-01-17 | End: 2022-01-17 | Stop reason: HOSPADM

## 2022-01-17 RX ORDER — ACETAMINOPHEN 325 MG/1
975 TABLET ORAL EVERY 6 HOURS
Status: DISCONTINUED | OUTPATIENT
Start: 2022-01-17 | End: 2022-01-19 | Stop reason: HOSPADM

## 2022-01-17 RX ORDER — OXYTOCIN 10 [USP'U]/ML
10 INJECTION, SOLUTION INTRAMUSCULAR; INTRAVENOUS
Status: DISCONTINUED | OUTPATIENT
Start: 2022-01-17 | End: 2022-01-19 | Stop reason: HOSPADM

## 2022-01-17 RX ORDER — FENTANYL CITRATE 50 UG/ML
INJECTION, SOLUTION INTRAMUSCULAR; INTRAVENOUS PRN
Status: DISCONTINUED | OUTPATIENT
Start: 2022-01-17 | End: 2022-01-17

## 2022-01-17 RX ORDER — NALBUPHINE HYDROCHLORIDE 10 MG/ML
2.5-5 INJECTION, SOLUTION INTRAMUSCULAR; INTRAVENOUS; SUBCUTANEOUS EVERY 6 HOURS PRN
Status: DISCONTINUED | OUTPATIENT
Start: 2022-01-17 | End: 2022-01-17 | Stop reason: CLARIF

## 2022-01-17 RX ORDER — OXYTOCIN/0.9 % SODIUM CHLORIDE 30/500 ML
PLASTIC BAG, INJECTION (ML) INTRAVENOUS PRN
Status: DISCONTINUED | OUTPATIENT
Start: 2022-01-17 | End: 2022-01-17

## 2022-01-17 RX ORDER — MODIFIED LANOLIN
OINTMENT (GRAM) TOPICAL
Status: DISCONTINUED | OUTPATIENT
Start: 2022-01-17 | End: 2022-01-19 | Stop reason: HOSPADM

## 2022-01-17 RX ORDER — TRANEXAMIC ACID 10 MG/ML
1 INJECTION, SOLUTION INTRAVENOUS EVERY 30 MIN PRN
Status: DISCONTINUED | OUTPATIENT
Start: 2022-01-17 | End: 2022-01-17 | Stop reason: HOSPADM

## 2022-01-17 RX ORDER — OXYTOCIN/0.9 % SODIUM CHLORIDE 30/500 ML
340 PLASTIC BAG, INJECTION (ML) INTRAVENOUS CONTINUOUS PRN
Status: DISCONTINUED | OUTPATIENT
Start: 2022-01-17 | End: 2022-01-17 | Stop reason: HOSPADM

## 2022-01-17 RX ORDER — MISOPROSTOL 200 UG/1
800 TABLET ORAL
Status: DISCONTINUED | OUTPATIENT
Start: 2022-01-17 | End: 2022-01-19 | Stop reason: HOSPADM

## 2022-01-17 RX ORDER — DEXTROSE, SODIUM CHLORIDE, SODIUM LACTATE, POTASSIUM CHLORIDE, AND CALCIUM CHLORIDE 5; .6; .31; .03; .02 G/100ML; G/100ML; G/100ML; G/100ML; G/100ML
INJECTION, SOLUTION INTRAVENOUS CONTINUOUS
Status: DISCONTINUED | OUTPATIENT
Start: 2022-01-17 | End: 2022-01-19 | Stop reason: HOSPADM

## 2022-01-17 RX ORDER — KETOROLAC TROMETHAMINE 15 MG/ML
15 INJECTION, SOLUTION INTRAMUSCULAR; INTRAVENOUS EVERY 6 HOURS
Status: COMPLETED | OUTPATIENT
Start: 2022-01-17 | End: 2022-01-18

## 2022-01-17 RX ORDER — CITRIC ACID/SODIUM CITRATE 334-500MG
30 SOLUTION, ORAL ORAL ONCE
Status: COMPLETED | OUTPATIENT
Start: 2022-01-17 | End: 2022-01-17

## 2022-01-17 RX ORDER — MISOPROSTOL 200 UG/1
400 TABLET ORAL
Status: DISCONTINUED | OUTPATIENT
Start: 2022-01-17 | End: 2022-01-17 | Stop reason: HOSPADM

## 2022-01-17 RX ORDER — BISACODYL 10 MG
10 SUPPOSITORY, RECTAL RECTAL DAILY PRN
Status: DISCONTINUED | OUTPATIENT
Start: 2022-01-19 | End: 2022-01-19 | Stop reason: HOSPADM

## 2022-01-17 RX ORDER — PROCHLORPERAZINE 25 MG
25 SUPPOSITORY, RECTAL RECTAL EVERY 12 HOURS PRN
Status: DISCONTINUED | OUTPATIENT
Start: 2022-01-17 | End: 2022-01-19 | Stop reason: HOSPADM

## 2022-01-17 RX ORDER — ONDANSETRON 2 MG/ML
4 INJECTION INTRAMUSCULAR; INTRAVENOUS EVERY 6 HOURS PRN
Status: DISCONTINUED | OUTPATIENT
Start: 2022-01-17 | End: 2022-01-19 | Stop reason: HOSPADM

## 2022-01-17 RX ORDER — CLINDAMYCIN PHOSPHATE 900 MG/50ML
900 INJECTION, SOLUTION INTRAVENOUS
Status: COMPLETED | OUTPATIENT
Start: 2022-01-17 | End: 2022-01-17

## 2022-01-17 RX ORDER — ONDANSETRON 2 MG/ML
4 INJECTION INTRAMUSCULAR; INTRAVENOUS EVERY 6 HOURS PRN
Status: DISCONTINUED | OUTPATIENT
Start: 2022-01-17 | End: 2022-01-17 | Stop reason: HOSPADM

## 2022-01-17 RX ORDER — MAGNESIUM HYDROXIDE 1200 MG/15ML
LIQUID ORAL PRN
Status: DISCONTINUED | OUTPATIENT
Start: 2022-01-17 | End: 2022-01-17

## 2022-01-17 RX ORDER — LIDOCAINE 40 MG/G
CREAM TOPICAL
Status: DISCONTINUED | OUTPATIENT
Start: 2022-01-17 | End: 2022-01-19 | Stop reason: HOSPADM

## 2022-01-17 RX ORDER — CITRIC ACID/SODIUM CITRATE 334-500MG
30 SOLUTION, ORAL ORAL
Status: DISCONTINUED | OUTPATIENT
Start: 2022-01-17 | End: 2022-01-17

## 2022-01-17 RX ORDER — OXYTOCIN/0.9 % SODIUM CHLORIDE 30/500 ML
340 PLASTIC BAG, INJECTION (ML) INTRAVENOUS CONTINUOUS PRN
Status: DISCONTINUED | OUTPATIENT
Start: 2022-01-17 | End: 2022-01-19 | Stop reason: HOSPADM

## 2022-01-17 RX ORDER — BUPIVACAINE HYDROCHLORIDE 7.5 MG/ML
INJECTION, SOLUTION INTRASPINAL PRN
Status: DISCONTINUED | OUTPATIENT
Start: 2022-01-17 | End: 2022-01-17

## 2022-01-17 RX ORDER — ONDANSETRON 2 MG/ML
INJECTION INTRAMUSCULAR; INTRAVENOUS PRN
Status: DISCONTINUED | OUTPATIENT
Start: 2022-01-17 | End: 2022-01-17

## 2022-01-17 RX ORDER — AMOXICILLIN 250 MG
1 CAPSULE ORAL 2 TIMES DAILY
Status: DISCONTINUED | OUTPATIENT
Start: 2022-01-17 | End: 2022-01-19 | Stop reason: HOSPADM

## 2022-01-17 RX ORDER — OXYTOCIN 10 [USP'U]/ML
10 INJECTION, SOLUTION INTRAMUSCULAR; INTRAVENOUS
Status: DISCONTINUED | OUTPATIENT
Start: 2022-01-17 | End: 2022-01-17 | Stop reason: HOSPADM

## 2022-01-17 RX ORDER — PHENYLEPHRINE HYDROCHLORIDE 10 MG/ML
INJECTION INTRAVENOUS PRN
Status: DISCONTINUED | OUTPATIENT
Start: 2022-01-17 | End: 2022-01-17

## 2022-01-17 RX ORDER — ONDANSETRON 4 MG/1
4 TABLET, ORALLY DISINTEGRATING ORAL EVERY 6 HOURS PRN
Status: DISCONTINUED | OUTPATIENT
Start: 2022-01-17 | End: 2022-01-17 | Stop reason: HOSPADM

## 2022-01-17 RX ORDER — SODIUM CHLORIDE, SODIUM LACTATE, POTASSIUM CHLORIDE, CALCIUM CHLORIDE 600; 310; 30; 20 MG/100ML; MG/100ML; MG/100ML; MG/100ML
INJECTION, SOLUTION INTRAVENOUS CONTINUOUS
Status: DISCONTINUED | OUTPATIENT
Start: 2022-01-17 | End: 2022-01-17 | Stop reason: HOSPADM

## 2022-01-17 RX ORDER — METHYLERGONOVINE MALEATE 0.2 MG/ML
200 INJECTION INTRAVENOUS
Status: DISCONTINUED | OUTPATIENT
Start: 2022-01-17 | End: 2022-01-17 | Stop reason: HOSPADM

## 2022-01-17 RX ADMIN — ACETAMINOPHEN 975 MG: 325 TABLET, FILM COATED ORAL at 20:19

## 2022-01-17 RX ADMIN — FENTANYL CITRATE 15 MCG: 50 INJECTION, SOLUTION INTRAMUSCULAR; INTRAVENOUS at 09:10

## 2022-01-17 RX ADMIN — GENTAMICIN SULFATE 120 MG: 40 INJECTION, SOLUTION INTRAMUSCULAR; INTRAVENOUS at 09:04

## 2022-01-17 RX ADMIN — ACETAMINOPHEN 975 MG: 325 TABLET, FILM COATED ORAL at 08:40

## 2022-01-17 RX ADMIN — CLINDAMYCIN PHOSPHATE 900 MG: 900 INJECTION, SOLUTION INTRAVENOUS at 08:39

## 2022-01-17 RX ADMIN — Medication 50 MCG/MIN: at 09:10

## 2022-01-17 RX ADMIN — PHENYLEPHRINE HYDROCHLORIDE 50 MCG: 10 INJECTION INTRAVENOUS at 09:47

## 2022-01-17 RX ADMIN — BUPIVACAINE HYDROCHLORIDE IN DEXTROSE 1.6 ML: 7.5 INJECTION, SOLUTION SUBARACHNOID at 09:10

## 2022-01-17 RX ADMIN — PHENYLEPHRINE HYDROCHLORIDE 100 MCG: 10 INJECTION INTRAVENOUS at 09:54

## 2022-01-17 RX ADMIN — SODIUM CITRATE AND CITRIC ACID MONOHYDRATE 30 ML: 500; 334 SOLUTION ORAL at 08:43

## 2022-01-17 RX ADMIN — OXYTOCIN-SODIUM CHLORIDE 0.9% IV SOLN 30 UNIT/500ML 1 ML: 30-0.9/5 SOLUTION at 09:34

## 2022-01-17 RX ADMIN — KETOROLAC TROMETHAMINE 15 MG: 15 INJECTION, SOLUTION INTRAMUSCULAR; INTRAVENOUS at 12:02

## 2022-01-17 RX ADMIN — SODIUM CHLORIDE, POTASSIUM CHLORIDE, SODIUM LACTATE AND CALCIUM CHLORIDE: 600; 310; 30; 20 INJECTION, SOLUTION INTRAVENOUS at 08:24

## 2022-01-17 RX ADMIN — OXYTOCIN-SODIUM CHLORIDE 0.9% IV SOLN 30 UNIT/500ML 299 ML: 30-0.9/5 SOLUTION at 10:04

## 2022-01-17 RX ADMIN — KETOROLAC TROMETHAMINE 15 MG: 15 INJECTION, SOLUTION INTRAMUSCULAR; INTRAVENOUS at 20:19

## 2022-01-17 RX ADMIN — ONDANSETRON 4 MG: 2 INJECTION INTRAMUSCULAR; INTRAVENOUS at 09:37

## 2022-01-17 RX ADMIN — Medication 150 MCG: at 09:10

## 2022-01-17 RX ADMIN — SODIUM CHLORIDE, SODIUM LACTATE, POTASSIUM CHLORIDE, CALCIUM CHLORIDE AND DEXTROSE MONOHYDRATE: 5; 600; 310; 30; 20 INJECTION, SOLUTION INTRAVENOUS at 11:50

## 2022-01-17 ASSESSMENT — ACTIVITIES OF DAILY LIVING (ADL)
ADLS_ACUITY_SCORE: 4

## 2022-01-17 ASSESSMENT — MIFFLIN-ST. JEOR: SCORE: 1425.39

## 2022-01-17 NOTE — PLAN OF CARE
Data: Jessica Noble transferred to [id number] via cartr at 0110. Baby transferred via parent's arms.  Action: Receiving unit notified of transfer: Yes. Patient and family notified of room change. Report given to Ramona GONZALES at 1230. Belongings sent to receiving unit. Accompanied by Registered Nurse. Oriented patient to surroundings. Call light within reach. ID bands double-checked with receiving RN.  Response: Patient tolerated transfer and is stable.

## 2022-01-17 NOTE — ANESTHESIA POSTPROCEDURE EVALUATION
Patient: Jessica Noble    Procedure: Procedure(s):   SECTION primary       Diagnosis:Breech presentation [O32.1XX0]  Pregnancy affected by fetal growth restriction [O36.5990]  Diagnosis Additional Information: No value filed.    Anesthesia Type:  Spinal    Note:  Disposition: Inpatient   Postop Pain Control: Uneventful            Sign Out: Well controlled pain   PONV: No   Neuro/Psych: Uneventful            Sign Out: Anticipate uneventful recovery from spinal.   Airway/Respiratory: Uneventful            Sign Out: Acceptable/Baseline resp. status   CV/Hemodynamics: Uneventful            Sign Out: Acceptable CV status   Other NRE: NONE   DID A NON-ROUTINE EVENT OCCUR? No           Last vitals:  Vitals Value Taken Time   BP 99/51 22 1013   Temp     Pulse     Resp     SpO2 93 % 22 1013   Vitals shown include unvalidated device data.    Electronically Signed By: Lori Jacques MD  2022  10:18 AM
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
## 2022-01-17 NOTE — PLAN OF CARE
Data: Jessica Noble transferred to [id number] via cart at 1310. Baby transferred via parent's arms.  Action: Receiving unit notified of transfer: Yes. Patient and family notified of room change. Report given to Ramona LIM  at 1230. Belongings sent to receiving unit. Accompanied by Registered Nurse. Oriented patient to surroundings. Call light within reach. ID bands double-checked with receiving RN.  Response: Patient tolerated transfer and is stable.
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
## 2022-01-17 NOTE — OP NOTE
Operative Note    Pre-op Diagnosis:  Intrauterine Pregnancy at 38 0/7wks, Breech presentation and declined trial of version, Intrauterine growth restriction    Post-op Diagnosis:  Same, Term live male infant delivered, Luis Antonio breech presentation, Intrauterine growth restriction, Bicornuate uterus with rudimentary left horn    Procedure: Primary Low Transverse  Section    Surgeon:  Perico Pringle MD    Anesthesia:  Spinal  Fluids:  1200 cc LR, Gentamicin 120 mg and Clindamycin 900 mg IV pre-op, Pitocin after cord clamp  QBL:  363 cc  Drains:  Jeff - clear yellow urine during the case    Indications for Procedure:  Patient is a 21 year old year old  with an intrauterine pregnancy at 38 0/7 weeks who has a term infant who is known to be breech, and confirmed to be today prior to going to the OR.  She declined a version attempt at 37 weeks due to fetus being followed for IUGR.  It is also due to the IUGR status that Maternal Fetal Medicine recommended delivery after 38 weeks.  Therefore she is to undergo primary  section.  She understands risks, benefits, and alternatives to the above and has given informed consent.      Findings:  Term live male infant, Luis Antonio BREECH, 5# 8oz. (2500 g), Apgars 8(1min) 9(5min), amniotic fluid-Clear, 3v cord, No loop nuchal cord, normal placenta, Uterus is bicornate with the pregnancy having been in the right horn, and the left horn is rudimentary with a normal tube and left ovary, right tube and ovary are normal.    Specimens:  Placenta    Procedure in detail:  After proper patient identification and informed consent was obtained, the patient was taken to the operating room where adequate Spinal anesthesia was obtained.  Patient was placed in dorsal supine position with leftward tilt.  Jeff catheter was placed.  Patient was prepped and draped in usual sterile manner for this procedure.    A Pfannenstiel skin incision was made with a knife and carried down through  the underlying subcutaneous fat to the anterior rectus fascia.  Bleeders were cauterized along the way.  The anterior rectus fascia was nicked transversely with a knife and the incision was extended bilaterally in a semilunar fashion with curved Head scissors.  The superior and inferior edges of the fascial incision were tented with Kocher clamps and sharply dissected from the underlying rectus muscle bellies.  After this was performed, the peritoneal incision was extended superiorly and inferiorly avoiding the bladder. An Tomasz-O retractor was placed. Bladder blade was placed.  Vesicouterine peritoneum overlying the lower uterine segment was incised with a Metzenbaum scissors and the incision was extended bilaterally and the bladder flap was created with sharp dissection.  The bladder blade was placed to retract the bladder flap out of harm's way.    The lower uterine segment was then scored transversely with a knife superficially and then the incision was carried down in the central portion of the score line until the amniotic space was reached.  Clear fluid was noted.  Blunt traction was applied in a craniocaudad manner, extended the incision along the score line.  The breech was atraumatically delivered as it was lifted up through the incision with moderate fundal pressure until the level of the umbilicus.  Then the right leg was flexed and delivered.  The infant was wrapped in a wet towel and rotated clockwise, then the left leg was flexed and delivered.  The infant was then delivered to the level of the scapula.  The infant was rotated counterclockwise and the right arm was flexed and delivered.  The infant was rotated clockwise, and the left arm was flexed and delivered.  The head was delivered using a modified Mauriceau maneuver atraumatically.  The oropharynx and nares were bulb suctioned on the operative field.  A 3v cord that had no nuchal loop was noted was doubly clamped and cut and the infant was  handed off to the nursing staff in attendance.  Cord blood was obtained.  The patient received IV Pitocin.  She received Ancef preoperatively.  Placenta was delivered spontaneously with gentle cord traction and noted to be normal and intact.  The uterine cavity was cleared of all clots and debris.  The uterine incision was closed using a running locking #1 chromic gut suture and then followed by an imbricating suture of #1 chromic gut in a modified Lembert stitch.  The uterine incision was hemostatic at this point.  The Tomasz-O retractor was removed.  The bladder flap was reapproximated with a running 2-0 Vicryl suture.    The pericolic gutters were copiously irrigated with warm normal saline and cleared of any clots and debris.  Reinspection of all operative sites confirmed good hemostasis.    The rectus muscle bellies were then loosely reapproximated in the midline using interrupted simple 2-0 Vicryl sutures to try to reduce the likelihood of diastasis.  The subfascial space was copiously irrigated with warm normal saline and any remaining bleeders were cauterized.  The anterior rectus fascia was closed using running 0 PDS suture.  The subcutaneous layer was copiously irrigated with warm normal saline and any remaining bleeders were cauterized.  Subcutaneous layer was reapproximated using running 2-0 Monocryl sutures to close the dead space.  Skin was closed using running 4-0 Vicryl subcuticular suture.  Steri-Strips were placed.    The patient tolerated the procedure well.  Sponge, instrument and needle counts were correct x3.  The patient was taken to the recovery room in stable condition.      Perico Pringle MD

## 2022-01-17 NOTE — ANESTHESIA CARE TRANSFER NOTE
Patient: Jessica Noble    Procedure: Procedure(s):   SECTION primary       Diagnosis: Breech presentation [O32.1XX0]  Pregnancy affected by fetal growth restriction [O36.5990]  Diagnosis Additional Information: No value filed.    Anesthesia Type:   Spinal     Note:    Oropharynx: oropharynx clear of all foreign objects and spontaneously breathing  Level of Consciousness: awake  Oxygen Supplementation: room air    Independent Airway: airway patency satisfactory and stable  Dentition: dentition unchanged  Vital Signs Stable: post-procedure vital signs reviewed and stable  Report to RN Given: handoff report given  Patient transferred to: Labor and Delivery    Handoff Report: Identifed the Patient, Identified the Reponsible Provider, Reviewed the pertinent medical history, Discussed the surgical course, Reviewed Intra-OP anesthesia mangement and issues during anesthesia, Set expectations for post-procedure period and Allowed opportunity for questions and acknowledgement of understanding      Vitals:  Vitals Value Taken Time   BP 99/51 22 1013   Temp     Pulse     Resp     SpO2 93 % 22 1013   Vitals shown include unvalidated device data.    Electronically Signed By: ELVIA Bah CRNA  2022  10:15 AM

## 2022-01-17 NOTE — ANESTHESIA PROCEDURE NOTES
Intrathecal injection Procedure Note    Pre-Procedure   Staff -        Anesthesiologist:  Lori Jacques MD       Performed By: anesthesiologist       Referred By: LALITO Pringle       Location: OR       Pre-Anesthestic Checklist: patient identified, IV checked, risks and benefits discussed, informed consent, monitors and equipment checked, pre-op evaluation, at physician/surgeon's request and post-op pain management  Timeout:       Correct Patient: Yes        Correct Procedure: Yes        Correct Site: Yes        Correct Position: Yes   Procedure Documentation  Procedure: intrathecal injection       Diagnosis: Primary C/S for breech presentation       Patient Position: sitting       Skin prep: Chloraprep       Insertion Site: L3-4. (midline approach).       Needle Gauge: 25.        Needle Length (Inches): 3.5        Spinal Needle Type: Pencan       Introducer used       Introducer: 20 G      # of attempts: 1 and  # of redirects:     Assessment/Narrative         Paresthesias: No.       CSF fluid: clear.

## 2022-01-17 NOTE — ANESTHESIA PREPROCEDURE EVALUATION
Anesthesia Pre-Procedure Evaluation    Patient: Jessica Noble   MRN: [id number] : 2000        Preoperative Diagnosis: Breech presentation [O32.1XX0]  Pregnancy affected by fetal growth restriction [O36.5990]    Procedure : Procedure(s):   SECTION primary          Past Medical History:   Diagnosis Date     Depressive disorder     suicide attempt 2021, in patient, therapy     Gastritis 10/2020     Irritable bowel syndrome 10/2020     Low iron     history of in      Uncomplicated asthma     No Hosp; Albuterol prn and flovent daily      Past Surgical History:   Procedure Laterality Date     ENDOSCOPY       ENT SURGERY      tube as a child      Allergies   Allergen Reactions     Augmentin Other (See Comments), Hives and Unknown     OHC Reaction: Unknown; OHC Reaction: Hives; OHC Reaction Type: Allergy; OHC Severity: High; OHC Noted: 2014       Coconut Fatty Acids Anaphylaxis and Other (See Comments)     Tongue swells       Coconut Oil Other (See Comments)     HUT Reaction: Tongue Swelling; OHC Comment: Tongue swells; OHC Reaction: Anaphylaxis / Throat Swelling; OHC Reaction: Anaphylaxis; OHC Reaction Type: Allergy; OHC Severity: High; OHC Noted: 2019  HUT Reaction: Tongue Swelling       Kiwi Anaphylaxis and Other (See Comments)     Kiwi Extract Anaphylaxis     Penicillins Unknown and Hives     OHC Reaction: Hives; OHC Reaction Type: Allergy; OHC Severity: High; OHC Noted: 2019  OHC Reaction: Unknown; OHC Reaction: Hives; OHC Reaction Type: Allergy; OHC Severity: High; OHC Noted: 2014       Triamcinolone      Worsening rash     Lactose Other (See Comments)     HUT Comment: Stomach pain, nasal congestion; HUT Reaction: Other - Describe In Comment Field; OHC Comment: Stomach pain, nasal congestion; OHC Reaction: Other; OHC Reaction Type: Intolerance; OHC Severity: Low; OHC Noted: 2019  Stomach pain, nasal congestion  HUT Comment: Stomach pain, nasal congestion; HUT Reaction:  Other - Describe In Comment Field  Stomach pain, nasal congestion        Social History     Tobacco Use     Smoking status: Never Smoker     Smokeless tobacco: Never Used   Substance Use Topics     Alcohol use: Not Currently      Wt Readings from Last 1 Encounters:   22 64.9 kg (143 lb)        Anesthesia Evaluation            ROS/MED HX  ENT/Pulmonary:     (+) Intermittent, asthma     Neurologic:       Cardiovascular:  - neg cardiovascular ROS     METS/Exercise Tolerance:     Hematologic:    (-) anemia   Musculoskeletal:       GI/Hepatic: Comment: Gastritis      Renal/Genitourinary:       Endo:    (-) thyroid disease and obesity   Psychiatric/Substance Use: Comment: Hx SI with inpatient stay 2021      Infectious Disease:       Malignancy:       Other:    at 38wk. Breech presentation, IUGR         Physical Exam    Airway        Mallampati: III    Neck ROM: full   Mouth opening: < 3 cm    Respiratory Devices and Support         Dental           Cardiovascular   cardiovascular exam normal          Pulmonary   pulmonary exam normal                OUTSIDE LABS:  CBC:   Lab Results   Component Value Date    HGB 13.7 2022     BMP: No results found for: NA, POTASSIUM, CHLORIDE, CO2, BUN, CR, GLC  COAGS: No results found for: PTT, INR, FIBR  POC: No results found for: BGM, HCG, HCGS  HEPATIC: No results found for: ALBUMIN, PROTTOTAL, ALT, AST, GGT, ALKPHOS, BILITOTAL, BILIDIRECT, PATTY  OTHER: No results found for: PH, LACT, A1C, JOSE, PHOS, MAG, LIPASE, AMYLASE, TSH, T4, T3, CRP, SED    Anesthesia Plan    ASA Status:  2      Anesthesia Type: Spinal.              Consents    Anesthesia Plan(s) and associated risks, benefits, and realistic alternatives discussed. Questions answered and patient/representative(s) expressed understanding.    - Discussed:     - Discussed with:  Patient         Postoperative Care            Comments:    Other Comments: Spinal Block: Following a discussion of the anesthetic  options for surgery, spinal procedure, expected results, and risks and benefits (risks including, but not limited to, nerve damage, infection, bleeding/hematoma, spinal headache, partial or failed block), the patient appears to understand and consents to proceed. Discussed conversion to general anesthesia PRN in the event of a failed block or patient intolerance to the procedure. Questions were encouraged and answered.             Lori Jacques MD

## 2022-01-18 LAB — HGB BLD-MCNC: 11 G/DL (ref 11.7–15.7)

## 2022-01-18 PROCEDURE — 120N000001 HC R&B MED SURG/OB

## 2022-01-18 PROCEDURE — 250N000013 HC RX MED GY IP 250 OP 250 PS 637: Performed by: OBSTETRICS & GYNECOLOGY

## 2022-01-18 PROCEDURE — 250N000011 HC RX IP 250 OP 636: Performed by: OBSTETRICS & GYNECOLOGY

## 2022-01-18 PROCEDURE — 36415 COLL VENOUS BLD VENIPUNCTURE: CPT | Performed by: OBSTETRICS & GYNECOLOGY

## 2022-01-18 PROCEDURE — 85018 HEMOGLOBIN: CPT | Performed by: OBSTETRICS & GYNECOLOGY

## 2022-01-18 RX ADMIN — ACETAMINOPHEN 975 MG: 325 TABLET, FILM COATED ORAL at 21:14

## 2022-01-18 RX ADMIN — SENNOSIDES AND DOCUSATE SODIUM 2 TABLET: 50; 8.6 TABLET ORAL at 21:14

## 2022-01-18 RX ADMIN — OXYCODONE HYDROCHLORIDE 5 MG: 5 TABLET ORAL at 21:14

## 2022-01-18 RX ADMIN — ACETAMINOPHEN 975 MG: 325 TABLET, FILM COATED ORAL at 08:45

## 2022-01-18 RX ADMIN — SIMETHICONE 80 MG: 80 TABLET, CHEWABLE ORAL at 15:03

## 2022-01-18 RX ADMIN — SENNOSIDES AND DOCUSATE SODIUM 1 TABLET: 50; 8.6 TABLET ORAL at 08:45

## 2022-01-18 RX ADMIN — SIMETHICONE 80 MG: 80 TABLET, CHEWABLE ORAL at 18:06

## 2022-01-18 RX ADMIN — IBUPROFEN 600 MG: 600 TABLET, FILM COATED ORAL at 21:14

## 2022-01-18 RX ADMIN — KETOROLAC TROMETHAMINE 15 MG: 15 INJECTION, SOLUTION INTRAMUSCULAR; INTRAVENOUS at 02:36

## 2022-01-18 RX ADMIN — ACETAMINOPHEN 975 MG: 325 TABLET, FILM COATED ORAL at 02:36

## 2022-01-18 RX ADMIN — KETOROLAC TROMETHAMINE 15 MG: 15 INJECTION, SOLUTION INTRAMUSCULAR; INTRAVENOUS at 08:45

## 2022-01-18 RX ADMIN — ACETAMINOPHEN 975 MG: 325 TABLET, FILM COATED ORAL at 14:59

## 2022-01-18 RX ADMIN — OXYCODONE HYDROCHLORIDE 5 MG: 5 TABLET ORAL at 17:09

## 2022-01-18 RX ADMIN — IBUPROFEN 600 MG: 600 TABLET, FILM COATED ORAL at 14:58

## 2022-01-18 ASSESSMENT — ACTIVITIES OF DAILY LIVING (ADL)
ADLS_ACUITY_SCORE: 4

## 2022-01-18 NOTE — PROGRESS NOTES
Postpartum progress note  2022     S: Feeling ok. Pain is pretty well controlled. Lochia minimal. Ambulating without difficulty. Voiding without difficulty. Passing flatus, no BM yet.  Tolerating po intake.     PHYSICAL EXAM:   Vitals:    22 2019 22 2351 22 0440 22 0845   BP: 110/64 105/71 103/61 113/63   BP Location:       Pulse:  84 83 87   Resp: 16 16 16 16   Temp: 98.6  F (37  C) 98.5  F (36.9  C) 98.4  F (36.9  C) 98.2  F (36.8  C)   TempSrc: Oral Oral Oral Oral   SpO2:  97%     Weight:       Height:           General: sitting up, alert and cooperative  Abd: soft, non-distended, non-tender. Fundus firm, appropriately tender, 2 cm below umbilicus.   Incision clean/dry with small amount of old bleeding with steri strips in place.  Extremities: calves nontender, trace edema of lower extremities bilaterally    Vitals:    22 0732   Weight: 64.9 kg (143 lb)       Lab Results   Component Value Date    HGB 11.0 2022    HGB 13.7 2022     Blood type: No results found for: RH      ASSESSMENT: 21 year old  POD 1 s/p PLTCS.     PLAN:  - Heme: 13.7 > 11.0, no s/s ABLA  - Feeding: formula, baby in NICU  - Birth control: not discussed   - Rh status: pos, Rhogam not indicated   - Dispo: home tomorrow or POD#3 when meeting post op goals    Lucinda Cade MD, MPH  [facility]

## 2022-01-18 NOTE — PLAN OF CARE
VSS. UTV incision. Up independently in room. st cathd for 800 at 0030, has since voided. Pain managed with tylenol and toradol. Encouraged pumping every 3 hours. Visiting infant in NICU. Friend at bedside, involved and supportive.

## 2022-01-18 NOTE — PLAN OF CARE
Data: Vital signs within normal limits. Postpartum checks within normal limits - see flow record. Patient eating and drinking normally. Patient able to empty bladder independently encouraged to void frequently, and is up ambulating. No apparent signs of infection. Incision dressing intact will plan to remove today, Patient performing self cares and is able to care for infant.Seen by SWS see note.   Action: Patient medicated during the shift for pain. See MAR. Patient reassessed within 1 hour after each medication and pain score 0 - patient stated she was comfortable. Patient education done about  See flow record.  Response: Positive attachment behaviors observed with infant. Support persons patients father  present.   Plan: Anticipate discharge PPD #2 or #3  13.00 Dressing removed , steri strips intact to incision, dried drainage only, patient plans to shower. Has walked down to  visit baby in NICU  Complaining of gas discomfort simethicone given.  .
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
## 2022-01-19 VITALS
HEART RATE: 88 BPM | WEIGHT: 143 LBS | RESPIRATION RATE: 16 BRPM | TEMPERATURE: 97.7 F | DIASTOLIC BLOOD PRESSURE: 70 MMHG | OXYGEN SATURATION: 98 % | HEIGHT: 66 IN | BODY MASS INDEX: 22.98 KG/M2 | SYSTOLIC BLOOD PRESSURE: 121 MMHG

## 2022-01-19 LAB
PATH REPORT.COMMENTS IMP SPEC: NORMAL
PATH REPORT.COMMENTS IMP SPEC: NORMAL
PATH REPORT.FINAL DX SPEC: NORMAL
PATH REPORT.GROSS SPEC: NORMAL
PATH REPORT.MICROSCOPIC SPEC OTHER STN: NORMAL
PATH REPORT.RELEVANT HX SPEC: NORMAL
PHOTO IMAGE: NORMAL

## 2022-01-19 PROCEDURE — 88307 TISSUE EXAM BY PATHOLOGIST: CPT | Mod: 26 | Performed by: PATHOLOGY

## 2022-01-19 PROCEDURE — 250N000013 HC RX MED GY IP 250 OP 250 PS 637: Performed by: OBSTETRICS & GYNECOLOGY

## 2022-01-19 RX ORDER — OXYCODONE HYDROCHLORIDE 5 MG/1
5 CAPSULE ORAL EVERY 4 HOURS PRN
Qty: 10 CAPSULE | Refills: 0 | Status: SHIPPED | OUTPATIENT
Start: 2022-01-19

## 2022-01-19 RX ORDER — DOCUSATE SODIUM 100 MG/1
100 CAPSULE, LIQUID FILLED ORAL 2 TIMES DAILY
Qty: 30 CAPSULE | Refills: 0 | Status: SHIPPED | OUTPATIENT
Start: 2022-01-19

## 2022-01-19 RX ADMIN — ACETAMINOPHEN 975 MG: 325 TABLET, FILM COATED ORAL at 03:41

## 2022-01-19 RX ADMIN — OXYCODONE HYDROCHLORIDE 5 MG: 5 TABLET ORAL at 11:39

## 2022-01-19 RX ADMIN — SENNOSIDES AND DOCUSATE SODIUM 2 TABLET: 50; 8.6 TABLET ORAL at 11:27

## 2022-01-19 RX ADMIN — IBUPROFEN 600 MG: 600 TABLET, FILM COATED ORAL at 03:41

## 2022-01-19 RX ADMIN — ACETAMINOPHEN 975 MG: 325 TABLET, FILM COATED ORAL at 11:28

## 2022-01-19 RX ADMIN — IBUPROFEN 600 MG: 600 TABLET, FILM COATED ORAL at 11:28

## 2022-01-19 ASSESSMENT — ACTIVITIES OF DAILY LIVING (ADL)
ADLS_ACUITY_SCORE: 4

## 2022-01-19 NOTE — PLAN OF CARE
Pt  vitals are WNL. Fundus is firm -2, scant flow, pt reports no clots. She is voiding without difficulty.  Incision barrier drsg C/D/I.  Passing flatus.  Pt is receiving scheduled tylenol and ibuprofen.  Pt is asking good questions and bonding well with baby. Pt did call out for a bottle and when staff went into the room she asked for water to mix formula.  Pt educated regarding pre made formula bottles and powder formula needing water.  Next feeding she plans on measuring the amount and feed infant  Independently.

## 2022-01-19 NOTE — PLAN OF CARE
Pt doing well POD 2 and requesting to discharge home today. Incision closed with steri strips. Good pain relief with medications. Up ad gurpreet in room and in hallway. Showered today. Pt's dad here and supportive to pt and baby.     Discharge instructions reviewed and all questions answered. Meds given to use at home. Homecare unable to see pt since she lives in [place]. Pt has follow up appt already scheduled for baby on Friday, Jan 21. Discharged home with baby at 1200.

## 2022-01-19 NOTE — DISCHARGE SUMMARY
[facility]etric[facility] Post-Op / Progress Note         Assessment and Plan:    Assessment:   Post-operative day #2  Low transverse primary  section  L&D complications: Breech presentation      Doing well.  Clean wound without signs of infection.  No immediate surgical complications identified.  No excessive bleeding  Pain well-controlled.  Requesting discharge      Plan:   Discharge later today  Follow up in 6 weeks - OK to follow up with FP in [place] if desires assuming PP recovery uneventful  Scripts for oxycodone and colace provided           Interval History:   Doing well.  Pain is well-controlled.  No fevers.  No history of wound drainage, warmth or significant erythema.  Good appetite.  Denies chest pain, shortness of breath, nausea or vomiting.  Ambulatory.  Bottlefeeding.          Significant Problems:      Past Medical History:   Diagnosis Date     Bicornuate uterus, partial 2022      delivery delivered 2022     Depressive disorder     suicide attempt 2021, in patient, therapy     Gastritis 10/2020     Irritable bowel syndrome 10/2020     Low iron     history of in      Uncomplicated asthma     No Hosp; Albuterol prn and flovent daily             Review of Systems:    The patient denies any chest pain, shortness of breath, excessive pain, fever, chills, purulent drainage from the wound, nausea or vomiting.          Medications:   All medications related to the patient's surgery have been reviewed          Physical Exam:     All vitals stable  Patient Vitals for the past 24 hrs:   BP Temp Temp src Pulse Resp SpO2   22 0030 107/71 98.5  F (36.9  C) Oral 90 18 98 %   22 1530 115/65 98.3  F (36.8  C) Oral 96 18 --   22 0845 113/63 98.2  F (36.8  C) Oral 87 16 --     Wound clean and dry with minimal or no drainage.  Surrounding skin with minimal erythema.  Ext NT          Data:     Hemoglobin   Date Value Ref Range Status   2022 11.0 (L) 11.7 -  15.7 g/dL Final   01/16/2022 13.7 11.7 - 15.7 g/dL Final     -    Otto Briones MD  1/19/2022 7:43 AM

## 2022-01-19 NOTE — PLAN OF CARE
Vitals stable. Incision clean and intact with steri strips in place. Up ambulating in room. Increased pain this shift, resolving by movement and taking oxy with motrin and tylenol. Simethicone given. Bowel sounds present. Deciding she does not want to breastfeed. Reviewed no breast stimulation. Baby transferred from NICU to mother's room. Jessica's father here most of shift.

## 2022-01-19 NOTE — PROGRESS NOTES
[facility] Obstetrics Post-Op / Progress Note         Assessment and Plan:    Assessment:   Post-operative day #2  Low transverse primary  section  L&D complications: Breech presentation      Doing well.  Clean wound without signs of infection.  No immediate surgical complications identified.  No excessive bleeding  Pain well-controlled.  Requesting discharge      Plan:   Discharge later today  Follow up in 6 weeks - OK to follow up with FP in [place] if desires assuming PP recovery uneventful  Scripts for oxycodone and colace provided           Interval History:   Doing well.  Pain is well-controlled.  No fevers.  No history of wound drainage, warmth or significant erythema.  Good appetite.  Denies chest pain, shortness of breath, nausea or vomiting.  Ambulatory.  Bottlefeeding.          Significant Problems:      Past Medical History:   Diagnosis Date     Bicornuate uterus, partial 2022      delivery delivered 2022     Depressive disorder     suicide attempt 2021, in patient, therapy     Gastritis 10/2020     Irritable bowel syndrome 10/2020     Low iron     history of in      Uncomplicated asthma     No Hosp; Albuterol prn and flovent daily             Review of Systems:    The patient denies any chest pain, shortness of breath, excessive pain, fever, chills, purulent drainage from the wound, nausea or vomiting.          Medications:   All medications related to the patient's surgery have been reviewed          Physical Exam:     All vitals stable  Patient Vitals for the past 24 hrs:   BP Temp Temp src Pulse Resp SpO2   22 0030 107/71 98.5  F (36.9  C) Oral 90 18 98 %   22 1530 115/65 98.3  F (36.8  C) Oral 96 18 --   22 0845 113/63 98.2  F (36.8  C) Oral 87 16 --     Wound clean and dry with minimal or no drainage.  Surrounding skin with minimal erythema.  Ext NT          Data:     Hemoglobin   Date Value Ref Range Status   2022 11.0 (L) 11.7 -  15.7 g/dL Final   01/16/2022 13.7 11.7 - 15.7 g/dL Final     -    Otto Briones MD  1/19/2022 7:43 AM

## 2022-01-20 ENCOUNTER — PATIENT OUTREACH (OUTPATIENT)
Dept: CARE COORDINATION | Facility: CLINIC | Age: 22
End: 2022-01-20
Payer: COMMERCIAL

## 2022-01-20 DIAGNOSIS — Z71.89 OTHER SPECIFIED COUNSELING: ICD-10-CM

## 2022-01-20 NOTE — PROGRESS NOTES
Clinic Care Coordination Contact  [facility]/Voicemail       Clinical Data: Care Coordinator Outreach  Outreach attempted x 1.  Left message on patient's voicemail with call back information and requested return call.  Plan: Care Coordinator will try to reach patient again in 1-2 business days.    ASTON Serrano  [phone]  [facility]

## 2022-01-21 NOTE — PROGRESS NOTES
Clinic Care Coordination Contact  [facility]: Post-Discharge Note  SITUATION                                                      Admission:    Admission Date: 22   Reason for Admission:  delivery delivered  Discharge:   Discharge Date: 22  Discharge Diagnosis:  delivery delivered    BACKGROUND                                                      Assessment:    Post-operative day #2  Low transverse primary  section  L&D complications: Breech presentation      Doing well.  Clean wound without signs of infection.  No immediate surgical complications identified.  No excessive bleeding  Pain well-controlled.  Requesting discharge      Plan:    Discharge later today  Follow up in 6 weeks - OK to follow up with FP in [place] if desires assuming PP recovery uneventful  Scripts for oxycodone and colace provided         ASSESSMENT      Enrollment  Primary Care Care Coordination Status: Not a Candidate    Discharge Assessment  How are you doing now that you are home?: Pt reports that both she and her baby are doing well. She had no questions or concerns about her discharge instructions or medications. She is aware of follow up appointments. She denies the need for furhter resources at this time.  How are your symptoms? (Red Flag symptoms escalate to triage hotline per guidelines): Improved  Do you feel your condition is stable enough to be safe at home until your provider visit?: Yes  Does the patient have their discharge instructions? : Yes  Does the patient have questions regarding their discharge instructions? : No  Were you started on any new medications or were there changes to any of your previous medications? : Yes  Does the patient have all of their medications?: Yes  Do you have questions regarding any of your medications? : No  Do you have all of your needed medical supplies or equipment (DME)?  (i.e. oxygen tank, CPAP, cane, etc.): Yes  Discharge follow-up appointment  scheduled within 14 calendar days? : Yes  Discharge Follow Up Appointment Date: 01/31/22  Discharge Follow Up Appointment Scheduled with?: Specialty Care Provider              PLAN                                                      Outpatient Plan:  Follow up with provider in 6 weeks for post-delivery check    No future appointments.      For any urgent concerns, please contact our 24 hour nurse triage line: [phone]         ASTON Serrano  [phone]  [facility]

## 2022-11-21 ENCOUNTER — HEALTH MAINTENANCE LETTER (OUTPATIENT)
Age: 22
End: 2022-11-21

## 2023-08-13 NOTE — PROGRESS NOTES
Please see full imaging report from [facility] program under imaging tab.      Aris Molina MD  Maternal Fetal Medicine     No

## 2023-11-26 ENCOUNTER — HEALTH MAINTENANCE LETTER (OUTPATIENT)
Age: 23
End: 2023-11-26

## (undated) DEVICE — BAG CLEAR TRASH 1.3M 39X33" P4040C

## (undated) DEVICE — SOL WATER IRRIG 1000ML BOTTLE 2F7114

## (undated) DEVICE — DRSG GAUZE 4X4" 8044

## (undated) DEVICE — PREP DURAPREP 26ML APL 8630

## (undated) DEVICE — BLADE CLIPPER SGL USE 9680

## (undated) DEVICE — SU PDS II 0 CT 36" Z358T

## (undated) DEVICE — GLOVE PROTEXIS MICRO 6.5  2D73PM65

## (undated) DEVICE — ESU GROUND PAD ADULT W/CORD E7507

## (undated) DEVICE — SOL NACL 0.9% IRRIG 1000ML BOTTLE 2F7124

## (undated) DEVICE — SU CHROMIC 1 CT-1 36" 925H

## (undated) DEVICE — LINEN TOWEL PACK X10 5473

## (undated) DEVICE — LINEN HALF SHEET 5512

## (undated) DEVICE — ESU PENCIL SMOKE EVAC W/ROCKER SWITCH 0703-047-000

## (undated) DEVICE — STOCKING SLEEVE VASOPRESS COMPRESSION CALF MED VP501M

## (undated) DEVICE — SU VICRYL 4-0 PS-2 18" UND J496H

## (undated) DEVICE — DRSG STERI STRIP 1/2X4" R1547

## (undated) DEVICE — SU MONOCRYL 2-0 CT-1 36" UND Y945H

## (undated) DEVICE — Device

## (undated) DEVICE — LINEN BABY BLANKET 5434

## (undated) DEVICE — GLOVE PROTEXIS BLUE W/NEU-THERA 6.5  2D73EB65

## (undated) DEVICE — LINEN FULL SHEET 5511

## (undated) DEVICE — TRANSFER DEVICE BLOOD NDL HOLDER 364880

## (undated) DEVICE — DRSG ADAPTIC 3X8" 6113

## (undated) DEVICE — CAP BABY PINK/BLUE IC-2

## (undated) DEVICE — CATH TRAY FOLEY 16FR SILICONE 907416

## (undated) DEVICE — SU VICRYL 2-0 CT-1 27" UND J259H

## (undated) DEVICE — PACK C-SECTION LF PL15OTA83B

## (undated) RX ORDER — FENTANYL CITRATE 50 UG/ML
INJECTION, SOLUTION INTRAMUSCULAR; INTRAVENOUS
Status: DISPENSED
Start: 2022-01-17

## (undated) RX ORDER — EPHEDRINE SULFATE 50 MG/ML
INJECTION, SOLUTION INTRAMUSCULAR; INTRAVENOUS; SUBCUTANEOUS
Status: DISPENSED
Start: 2022-01-17

## (undated) RX ORDER — OXYTOCIN/0.9 % SODIUM CHLORIDE 30/500 ML
PLASTIC BAG, INJECTION (ML) INTRAVENOUS
Status: DISPENSED
Start: 2022-01-17

## (undated) RX ORDER — MORPHINE SULFATE 1 MG/ML
INJECTION, SOLUTION EPIDURAL; INTRATHECAL; INTRAVENOUS
Status: DISPENSED
Start: 2022-01-17